# Patient Record
Sex: MALE | Race: WHITE | NOT HISPANIC OR LATINO
[De-identification: names, ages, dates, MRNs, and addresses within clinical notes are randomized per-mention and may not be internally consistent; named-entity substitution may affect disease eponyms.]

---

## 2021-08-06 PROBLEM — Z00.00 ENCOUNTER FOR PREVENTIVE HEALTH EXAMINATION: Status: ACTIVE | Noted: 2021-08-06

## 2021-08-07 ENCOUNTER — APPOINTMENT (OUTPATIENT)
Dept: OPHTHALMOLOGY | Facility: CLINIC | Age: 71
End: 2021-08-07
Payer: MEDICARE

## 2021-08-07 ENCOUNTER — NON-APPOINTMENT (OUTPATIENT)
Age: 71
End: 2021-08-07

## 2021-08-07 PROCEDURE — 92014 COMPRE OPH EXAM EST PT 1/>: CPT

## 2021-08-07 PROCEDURE — 92133 CPTRZD OPH DX IMG PST SGM ON: CPT

## 2022-02-12 ENCOUNTER — APPOINTMENT (OUTPATIENT)
Dept: OPHTHALMOLOGY | Facility: CLINIC | Age: 72
End: 2022-02-12
Payer: MEDICARE

## 2022-02-12 ENCOUNTER — NON-APPOINTMENT (OUTPATIENT)
Age: 72
End: 2022-02-12

## 2022-02-12 PROCEDURE — 92133 CPTRZD OPH DX IMG PST SGM ON: CPT

## 2022-02-12 PROCEDURE — 92014 COMPRE OPH EXAM EST PT 1/>: CPT

## 2022-04-14 ENCOUNTER — APPOINTMENT (OUTPATIENT)
Dept: OPHTHALMOLOGY | Facility: CLINIC | Age: 72
End: 2022-04-14
Payer: MEDICARE

## 2022-04-14 ENCOUNTER — NON-APPOINTMENT (OUTPATIENT)
Age: 72
End: 2022-04-14

## 2022-04-14 PROCEDURE — 92012 INTRM OPH EXAM EST PATIENT: CPT

## 2022-04-18 ENCOUNTER — APPOINTMENT (OUTPATIENT)
Dept: OPHTHALMOLOGY | Facility: CLINIC | Age: 72
End: 2022-04-18
Payer: MEDICARE

## 2022-04-18 ENCOUNTER — NON-APPOINTMENT (OUTPATIENT)
Age: 72
End: 2022-04-18

## 2022-04-18 PROCEDURE — 99213 OFFICE O/P EST LOW 20 MIN: CPT

## 2022-04-21 ENCOUNTER — NON-APPOINTMENT (OUTPATIENT)
Age: 72
End: 2022-04-21

## 2022-04-21 ENCOUNTER — APPOINTMENT (OUTPATIENT)
Dept: OPHTHALMOLOGY | Facility: CLINIC | Age: 72
End: 2022-04-21
Payer: MEDICARE

## 2022-04-21 PROCEDURE — 92012 INTRM OPH EXAM EST PATIENT: CPT

## 2022-08-20 ENCOUNTER — NON-APPOINTMENT (OUTPATIENT)
Age: 72
End: 2022-08-20

## 2022-08-20 ENCOUNTER — APPOINTMENT (OUTPATIENT)
Dept: OPHTHALMOLOGY | Facility: CLINIC | Age: 72
End: 2022-08-20

## 2022-08-20 PROCEDURE — ZZZZZ: CPT

## 2022-08-20 PROCEDURE — 92133 CPTRZD OPH DX IMG PST SGM ON: CPT

## 2022-08-20 PROCEDURE — 92015 DETERMINE REFRACTIVE STATE: CPT

## 2022-08-20 PROCEDURE — 92014 COMPRE OPH EXAM EST PT 1/>: CPT

## 2022-10-04 ENCOUNTER — APPOINTMENT (OUTPATIENT)
Dept: OPHTHALMOLOGY | Facility: CLINIC | Age: 72
End: 2022-10-04

## 2022-10-04 ENCOUNTER — NON-APPOINTMENT (OUTPATIENT)
Age: 72
End: 2022-10-04

## 2022-10-04 PROCEDURE — 99199 UNLISTED SPECIAL SVC PX/RPRT: CPT | Mod: NC

## 2023-02-17 ENCOUNTER — APPOINTMENT (OUTPATIENT)
Dept: UROLOGY | Facility: CLINIC | Age: 73
End: 2023-02-17
Payer: MEDICARE

## 2023-02-17 VITALS
SYSTOLIC BLOOD PRESSURE: 169 MMHG | BODY MASS INDEX: 28.93 KG/M2 | WEIGHT: 180 LBS | DIASTOLIC BLOOD PRESSURE: 82 MMHG | HEART RATE: 66 BPM | RESPIRATION RATE: 17 BRPM | TEMPERATURE: 97.7 F | HEIGHT: 66 IN

## 2023-02-17 PROCEDURE — 99204 OFFICE O/P NEW MOD 45 MIN: CPT

## 2023-02-23 ENCOUNTER — NON-APPOINTMENT (OUTPATIENT)
Age: 73
End: 2023-02-23

## 2023-02-23 ENCOUNTER — APPOINTMENT (OUTPATIENT)
Dept: OPHTHALMOLOGY | Facility: CLINIC | Age: 73
End: 2023-02-23
Payer: MEDICARE

## 2023-02-23 PROCEDURE — 92133 CPTRZD OPH DX IMG PST SGM ON: CPT

## 2023-02-23 PROCEDURE — 92014 COMPRE OPH EXAM EST PT 1/>: CPT

## 2023-02-28 ENCOUNTER — APPOINTMENT (OUTPATIENT)
Dept: UROLOGY | Facility: CLINIC | Age: 73
End: 2023-02-28
Payer: MEDICARE

## 2023-02-28 ENCOUNTER — LABORATORY RESULT (OUTPATIENT)
Age: 73
End: 2023-02-28

## 2023-02-28 VITALS
WEIGHT: 180 LBS | RESPIRATION RATE: 16 BRPM | BODY MASS INDEX: 28.93 KG/M2 | DIASTOLIC BLOOD PRESSURE: 72 MMHG | SYSTOLIC BLOOD PRESSURE: 143 MMHG | HEART RATE: 61 BPM | HEIGHT: 66 IN

## 2023-02-28 DIAGNOSIS — Z86.39 PERSONAL HISTORY OF OTHER ENDOCRINE, NUTRITIONAL AND METABOLIC DISEASE: ICD-10-CM

## 2023-02-28 DIAGNOSIS — Z80.0 FAMILY HISTORY OF MALIGNANT NEOPLASM OF DIGESTIVE ORGANS: ICD-10-CM

## 2023-02-28 DIAGNOSIS — Z87.891 PERSONAL HISTORY OF NICOTINE DEPENDENCE: ICD-10-CM

## 2023-02-28 DIAGNOSIS — Z86.79 PERSONAL HISTORY OF OTHER DISEASES OF THE CIRCULATORY SYSTEM: ICD-10-CM

## 2023-02-28 PROCEDURE — 99214 OFFICE O/P EST MOD 30 MIN: CPT

## 2023-02-28 NOTE — ASSESSMENT
[FreeTextEntry1] : 72M with a small R upper pole renal mass a small L AML.  We will proceed with right renal mass biopsy and ablation.\par \par Small Renal Mass\par - CBC/BMP/coags/T&S\par - Covid swab 5 days pre-procedure\par - Scheduling for CT-guided RIGHT renal mass bx and interval ablation w/ IV sedation \par \par Discussed with pt the risks/benefits/alternative to renal mass biopsy.  Risks include bleeding infrequently requiring selective embolization, pleural injury requiring placement of a chest tube, damage to nearby organs/blood vessels such as the bowel/liver/spleen, and tumor track seeding which is exceedingly uncommon.  In rare cases, there can be loss of the renal unit.  Pt was given the opportunity to ask questions, and all questions and concerns were addressed. \par \par \par Left AML\par - Surveillance, no intervention while <4cm\par \par Thank you very much for allowing me to assist in the care of this patient. Should you have any additional questions or concerns please do not hesitate to contact me.\par \par \par Sincerely,\par \par \par Rory Perez D.O.\par Professor of Urology and Radiology\par  of Urology at [facility]\par System Director for Prostate Cancer\par [address] [facility], [facility]\par Phone: [phone]\par

## 2023-02-28 NOTE — HISTORY OF PRESENT ILLNESS
[FreeTextEntry1] : [place]at[place] Tom ([facility]) Chief Complaint Renal Mass bx + Ablation\par Date of first visit:  2/28/2023\par Daughter - Tierney\par \par DOROTA STEIN  is a 72 year old  with DM HTN and a right renal mass. The patient was counseled on the treatment options with the enlarging renal mass. The patient and his daughter were here for the consult.  The patient has no hx of abdominal surgeries. Small left AML (not clinical significance).   We reviewed risk of benign vs malignant.\par \par MRI 1/5/2023 at [facility].  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  Small upper left pole AML (8x9mm).  The images have been reviewed and clinical implications discussed with the patient.   \par \par 02/28/2023\par IPSS 1-2 x nocturia \par QOL 1\par  \par The patient denies fevers, chills, nausea and or vomiting and no unexplained weight loss.\par All pertinent laboratory, films and physician notes were reviewed.  Questionnaire results were discussed with patient.

## 2023-02-28 NOTE — PHYSICAL EXAM
[General Appearance - Well Developed] : well developed [General Appearance - Well Nourished] : well nourished [Edema] : no peripheral edema [] : no respiratory distress [Abdomen Soft] : soft [Normal Station and Gait] : the gait and station were normal for the patient's age [Skin Color & Pigmentation] : normal skin color and pigmentation [No Focal Deficits] : no focal deficits [Oriented To Time, Place, And Person] : oriented to person, place, and time [Not Anxious] : not anxious

## 2023-03-01 LAB
ANION GAP SERPL CALC-SCNC: 12 MMOL/L
APTT BLD: 34.3 SEC
BUN SERPL-MCNC: 23 MG/DL
CALCIUM SERPL-MCNC: 9.9 MG/DL
CHLORIDE SERPL-SCNC: 103 MMOL/L
CO2 SERPL-SCNC: 26 MMOL/L
CREAT SERPL-MCNC: 0.77 MG/DL
EGFR: 94 ML/MIN/1.73M2
GLUCOSE SERPL-MCNC: 124 MG/DL
INR PPP: 1.24 RATIO
POTASSIUM SERPL-SCNC: 4.3 MMOL/L
PT BLD: 14.7 SEC
SODIUM SERPL-SCNC: 142 MMOL/L

## 2023-03-02 LAB
BASOPHILS # BLD AUTO: 0.03 K/UL
BASOPHILS NFR BLD AUTO: 0.7 %
EOSINOPHIL # BLD AUTO: 0.05 K/UL
EOSINOPHIL NFR BLD AUTO: 1.1 %
HCT VFR BLD CALC: 37 %
HGB BLD-MCNC: 11.6 G/DL
IMM GRANULOCYTES NFR BLD AUTO: 1.3 %
LYMPHOCYTES # BLD AUTO: 1.61 K/UL
LYMPHOCYTES NFR BLD AUTO: 36.2 %
MAN DIFF?: NORMAL
MCHC RBC-ENTMCNC: 29.7 PG
MCHC RBC-ENTMCNC: 31.4 GM/DL
MCV RBC AUTO: 94.6 FL
MONOCYTES # BLD AUTO: 0.25 K/UL
MONOCYTES NFR BLD AUTO: 5.6 %
NEUTROPHILS # BLD AUTO: 2.45 K/UL
NEUTROPHILS NFR BLD AUTO: 55.1 %
PLATELET # BLD AUTO: 194 K/UL
RBC # BLD: 3.91 M/UL
RBC # FLD: 22.5 %
WBC # FLD AUTO: 4.45 K/UL

## 2023-03-29 ENCOUNTER — OUTPATIENT (OUTPATIENT)
Dept: OUTPATIENT SERVICES | Facility: HOSPITAL | Age: 73
LOS: 1 days | End: 2023-03-29
Payer: MEDICARE

## 2023-03-29 VITALS
SYSTOLIC BLOOD PRESSURE: 146 MMHG | WEIGHT: 180.78 LBS | TEMPERATURE: 97 F | DIASTOLIC BLOOD PRESSURE: 78 MMHG | RESPIRATION RATE: 14 BRPM | HEART RATE: 53 BPM | HEIGHT: 66 IN | OXYGEN SATURATION: 98 %

## 2023-03-29 DIAGNOSIS — N28.89 OTHER SPECIFIED DISORDERS OF KIDNEY AND URETER: ICD-10-CM

## 2023-03-29 DIAGNOSIS — Z98.890 OTHER SPECIFIED POSTPROCEDURAL STATES: Chronic | ICD-10-CM

## 2023-03-29 LAB
A1C WITH ESTIMATED AVERAGE GLUCOSE RESULT: 5.4 % — SIGNIFICANT CHANGE UP (ref 4–5.6)
ALBUMIN SERPL ELPH-MCNC: 4.7 G/DL — SIGNIFICANT CHANGE UP (ref 3.3–5)
ALP SERPL-CCNC: 48 U/L — SIGNIFICANT CHANGE UP (ref 40–120)
ALT FLD-CCNC: 22 U/L — SIGNIFICANT CHANGE UP (ref 4–41)
ANION GAP SERPL CALC-SCNC: 12 MMOL/L — SIGNIFICANT CHANGE UP (ref 7–14)
APTT BLD: 34.4 SEC — SIGNIFICANT CHANGE UP (ref 27–36.3)
AST SERPL-CCNC: 22 U/L — SIGNIFICANT CHANGE UP (ref 4–40)
BILIRUB SERPL-MCNC: 0.6 MG/DL — SIGNIFICANT CHANGE UP (ref 0.2–1.2)
BUN SERPL-MCNC: 19 MG/DL — SIGNIFICANT CHANGE UP (ref 7–23)
CALCIUM SERPL-MCNC: 10 MG/DL — SIGNIFICANT CHANGE UP (ref 8.4–10.5)
CHLORIDE SERPL-SCNC: 103 MMOL/L — SIGNIFICANT CHANGE UP (ref 98–107)
CO2 SERPL-SCNC: 25 MMOL/L — SIGNIFICANT CHANGE UP (ref 22–31)
CREAT SERPL-MCNC: 0.71 MG/DL — SIGNIFICANT CHANGE UP (ref 0.5–1.3)
EGFR: 96 ML/MIN/1.73M2 — SIGNIFICANT CHANGE UP
ESTIMATED AVERAGE GLUCOSE: 108 — SIGNIFICANT CHANGE UP
GLUCOSE SERPL-MCNC: 98 MG/DL — SIGNIFICANT CHANGE UP (ref 70–99)
HCT VFR BLD CALC: 36.1 % — LOW (ref 39–50)
HGB BLD-MCNC: 11.8 G/DL — LOW (ref 13–17)
INR BLD: 1.28 RATIO — HIGH (ref 0.88–1.16)
MCHC RBC-ENTMCNC: 29.6 PG — SIGNIFICANT CHANGE UP (ref 27–34)
MCHC RBC-ENTMCNC: 32.7 GM/DL — SIGNIFICANT CHANGE UP (ref 32–36)
MCV RBC AUTO: 90.7 FL — SIGNIFICANT CHANGE UP (ref 80–100)
NRBC # BLD: 0 /100 WBCS — SIGNIFICANT CHANGE UP (ref 0–0)
NRBC # FLD: 0 K/UL — SIGNIFICANT CHANGE UP (ref 0–0)
PLATELET # BLD AUTO: 226 K/UL — SIGNIFICANT CHANGE UP (ref 150–400)
POTASSIUM SERPL-MCNC: 3.9 MMOL/L — SIGNIFICANT CHANGE UP (ref 3.5–5.3)
POTASSIUM SERPL-SCNC: 3.9 MMOL/L — SIGNIFICANT CHANGE UP (ref 3.5–5.3)
PROT SERPL-MCNC: 7.7 G/DL — SIGNIFICANT CHANGE UP (ref 6–8.3)
PROTHROM AB SERPL-ACNC: 14.9 SEC — HIGH (ref 10.5–13.4)
RBC # BLD: 3.98 M/UL — LOW (ref 4.2–5.8)
RBC # FLD: 21.2 % — HIGH (ref 10.3–14.5)
SODIUM SERPL-SCNC: 140 MMOL/L — SIGNIFICANT CHANGE UP (ref 135–145)
WBC # BLD: 4.18 K/UL — SIGNIFICANT CHANGE UP (ref 3.8–10.5)
WBC # FLD AUTO: 4.18 K/UL — SIGNIFICANT CHANGE UP (ref 3.8–10.5)

## 2023-03-29 PROCEDURE — 93010 ELECTROCARDIOGRAM REPORT: CPT

## 2023-03-29 RX ORDER — TAMSULOSIN HYDROCHLORIDE 0.4 MG/1
1 CAPSULE ORAL
Refills: 0 | DISCHARGE

## 2023-03-29 RX ORDER — AMLODIPINE BESYLATE AND BENAZEPRIL HYDROCHLORIDE 10; 20 MG/1; MG/1
1 CAPSULE ORAL
Refills: 0 | DISCHARGE

## 2023-03-29 RX ORDER — METFORMIN HYDROCHLORIDE 850 MG/1
1 TABLET ORAL
Refills: 0 | DISCHARGE

## 2023-03-29 RX ORDER — FOLIC ACID 0.8 MG
1 TABLET ORAL
Refills: 0 | DISCHARGE

## 2023-03-29 RX ORDER — AMLODIPINE AND ATORVASTATIN 5; 20 MG/1; MG/1
1 TABLET, FILM COATED ORAL
Refills: 0 | DISCHARGE

## 2023-03-29 RX ORDER — LOSARTAN/HYDROCHLOROTHIAZIDE 100MG-25MG
1 TABLET ORAL
Refills: 0 | DISCHARGE

## 2023-03-29 RX ORDER — METOPROLOL TARTRATE 50 MG
1 TABLET ORAL
Refills: 0 | DISCHARGE

## 2023-03-29 NOTE — H&P PST ADULT - PROBLEM SELECTOR PLAN 1
Pt scheduled for right renal mass bx and ablation on 4/12/2023.  labs done results pending, ekg done.  Preop teaching done, pt able to verbalize understanding.   medication day of procedure metoprolol  anesthesia- RICHARDSON precautions  pst request  pt scheduled for medical eval as per surgeon, pst will also request due to multiple comorbidities   pmd Dr. Alfredo Marvin [phone]  echo 2021  stress 2021 Pt scheduled for right renal mass bx and ablation on 4/12/2023.  labs done results pending, ekg done.  Preop teaching done, pt able to verbalize understanding.   medication day of procedure metoprolol, pepcid  anesthesia- RICHARDSON precautions  pst request  pt scheduled for medical eval as per surgeon, pst will also request due to multiple comorbidities   pmd Dr. Alfredo Marvin [phone]  echo 2021  stress 2021 Pt scheduled for right renal mass bx and ablation on 4/12/2023.  labs done results pending, ekg done.  Preop teaching done, pt able to verbalize understanding.   medication day of procedure metoprolol, pepcid  anesthesia- RICHARDSON precautions  pst request  pt scheduled for medical eval as per surgeon, pst will also request due to multiple comorbidities   pmd Dr. Alfredo Marvin [phone]  echo 2022  stress 2022

## 2023-03-29 NOTE — H&P PST ADULT - GASTROINTESTINAL
negative normal/soft/nontender/nondistended/normal active bowel sounds/no guarding/no rigidity/no organomegaly/no palpable leah/no masses palpable

## 2023-03-29 NOTE — H&P PST ADULT - HISTORY OF PRESENT ILLNESS
71y/o male scheduled for right renal mass bx and ablation on 4/12/2023.  Pt states, "has right renal mass for many years, recent evaluation shows increase in size."

## 2023-03-29 NOTE — H&P PST ADULT - NSANTHOSAYNRD_GEN_A_CORE
No. RICHARDSON screening performed.  STOP BANG Legend: 0-2 = LOW Risk; 3-4 = INTERMEDIATE Risk; 5-8 = HIGH Risk

## 2023-03-29 NOTE — H&P PST ADULT - NSICDXPASTMEDICALHX_GEN_ALL_CORE_FT
PAST MEDICAL HISTORY:  History of BPH     Hypertension     Renal mass     Type 2 diabetes mellitus

## 2023-04-10 PROBLEM — Z87.438 PERSONAL HISTORY OF OTHER DISEASES OF MALE GENITAL ORGANS: Chronic | Status: ACTIVE | Noted: 2023-03-29

## 2023-04-10 PROBLEM — E11.9 TYPE 2 DIABETES MELLITUS WITHOUT COMPLICATIONS: Chronic | Status: ACTIVE | Noted: 2023-03-29

## 2023-04-10 PROBLEM — N28.89 OTHER SPECIFIED DISORDERS OF KIDNEY AND URETER: Chronic | Status: ACTIVE | Noted: 2023-03-29

## 2023-04-10 PROBLEM — I10 ESSENTIAL (PRIMARY) HYPERTENSION: Chronic | Status: ACTIVE | Noted: 2023-03-29

## 2023-04-12 ENCOUNTER — RESULT REVIEW (OUTPATIENT)
Age: 73
End: 2023-04-12

## 2023-04-12 ENCOUNTER — APPOINTMENT (OUTPATIENT)
Dept: UROLOGY | Facility: AMBULATORY SURGERY CENTER | Age: 73
End: 2023-04-12

## 2023-04-12 ENCOUNTER — OUTPATIENT (OUTPATIENT)
Dept: OUTPATIENT SERVICES | Facility: HOSPITAL | Age: 73
LOS: 1 days | End: 2023-04-12
Payer: MEDICARE

## 2023-04-12 DIAGNOSIS — N28.89 OTHER SPECIFIED DISORDERS OF KIDNEY AND URETER: ICD-10-CM

## 2023-04-12 DIAGNOSIS — Z98.890 OTHER SPECIFIED POSTPROCEDURAL STATES: Chronic | ICD-10-CM

## 2023-04-12 LAB
GLUCOSE BLDC GLUCOMTR-MCNC: 107 MG/DL — HIGH (ref 70–99)
GLUCOSE BLDC GLUCOMTR-MCNC: 139 MG/DL — HIGH (ref 70–99)

## 2023-04-12 PROCEDURE — 88305 TISSUE EXAM BY PATHOLOGIST: CPT | Mod: 26

## 2023-04-12 PROCEDURE — 88172 CYTP DX EVAL FNA 1ST EA SITE: CPT | Mod: 26

## 2023-04-12 PROCEDURE — 77012 CT SCAN FOR NEEDLE BIOPSY: CPT | Mod: 26,59

## 2023-04-12 PROCEDURE — 50592 PERC RF ABLATE RENAL TUMOR: CPT | Mod: RT

## 2023-04-12 PROCEDURE — 50200 RENAL BIOPSY PERQ: CPT | Mod: RT

## 2023-04-12 PROCEDURE — 77013 CT GUIDE FOR TISSUE ABLATION: CPT | Mod: 26

## 2023-04-13 ENCOUNTER — NON-APPOINTMENT (OUTPATIENT)
Age: 73
End: 2023-04-13

## 2023-04-14 LAB — NON-GYNECOLOGICAL CYTOLOGY STUDY: SIGNIFICANT CHANGE UP

## 2023-04-19 DIAGNOSIS — C64.1 MALIGNANT NEOPLASM OF RIGHT KIDNEY, EXCEPT RENAL PELVIS: ICD-10-CM

## 2023-04-26 ENCOUNTER — TRANSCRIPTION ENCOUNTER (OUTPATIENT)
Age: 73
End: 2023-04-26

## 2023-05-05 ENCOUNTER — APPOINTMENT (OUTPATIENT)
Dept: UROLOGY | Facility: CLINIC | Age: 73
End: 2023-05-05
Payer: MEDICARE

## 2023-05-05 VITALS
BODY MASS INDEX: 28.61 KG/M2 | HEART RATE: 56 BPM | TEMPERATURE: 97.6 F | WEIGHT: 178 LBS | RESPIRATION RATE: 17 BRPM | HEIGHT: 66 IN | DIASTOLIC BLOOD PRESSURE: 77 MMHG | SYSTOLIC BLOOD PRESSURE: 154 MMHG

## 2023-05-05 DIAGNOSIS — N28.89 OTHER SPECIFIED DISORDERS OF KIDNEY AND URETER: ICD-10-CM

## 2023-05-05 PROCEDURE — 99213 OFFICE O/P EST LOW 20 MIN: CPT

## 2023-05-05 NOTE — PHYSICAL EXAM
[General Appearance - Well Developed] : well developed [General Appearance - Well Nourished] : well nourished [Oriented To Time, Place, And Person] : oriented to person, place, and time [Not Anxious] : not anxious [FreeTextEntry1] : accompanied by his son

## 2023-05-05 NOTE — ASSESSMENT
[FreeTextEntry1] : 73 year old gentleman with right side lesion T1A  status post biopsy and ablation. 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 He has done well since the procedure.

## 2023-06-02 ENCOUNTER — NON-APPOINTMENT (OUTPATIENT)
Age: 73
End: 2023-06-02

## 2023-06-06 ENCOUNTER — APPOINTMENT (OUTPATIENT)
Dept: OPHTHALMOLOGY | Facility: CLINIC | Age: 73
End: 2023-06-06

## 2023-06-14 ENCOUNTER — OFFICE (OUTPATIENT)
Dept: URBAN - METROPOLITAN AREA CLINIC 115 | Facility: CLINIC | Age: 73
Setting detail: OPHTHALMOLOGY
End: 2023-06-14
Payer: MEDICARE

## 2023-06-14 ENCOUNTER — RX ONLY (RX ONLY)
Age: 73
End: 2023-06-14

## 2023-06-14 DIAGNOSIS — H43.811: ICD-10-CM

## 2023-06-14 DIAGNOSIS — H40.1120: ICD-10-CM

## 2023-06-14 DIAGNOSIS — H50.10: ICD-10-CM

## 2023-06-14 DIAGNOSIS — H53.433: ICD-10-CM

## 2023-06-14 DIAGNOSIS — H50.21: ICD-10-CM

## 2023-06-14 DIAGNOSIS — E11.9: ICD-10-CM

## 2023-06-14 DIAGNOSIS — H49.03: ICD-10-CM

## 2023-06-14 DIAGNOSIS — H02.402: ICD-10-CM

## 2023-06-14 DIAGNOSIS — H25.13: ICD-10-CM

## 2023-06-14 DIAGNOSIS — H35.033: ICD-10-CM

## 2023-06-14 PROCEDURE — 92201 OPSCPY EXTND RTA DRAW UNI/BI: CPT | Performed by: OPHTHALMOLOGY

## 2023-06-14 PROCEDURE — 99203 OFFICE O/P NEW LOW 30 MIN: CPT | Performed by: OPHTHALMOLOGY

## 2023-06-14 PROCEDURE — 92083 EXTENDED VISUAL FIELD XM: CPT | Performed by: OPHTHALMOLOGY

## 2023-06-14 PROCEDURE — 92060 SENSORIMOTOR EXAMINATION: CPT | Performed by: OPHTHALMOLOGY

## 2023-06-14 PROCEDURE — 92133 CPTRZD OPH DX IMG PST SGM ON: CPT | Performed by: OPHTHALMOLOGY

## 2023-06-14 ASSESSMENT — REFRACTION_AUTOREFRACTION
OS_CYLINDER: -0.25
OD_SPHERE: +0.25
OS_AXIS: 096
OD_AXIS: 110
OD_CYLINDER: -1.00
OS_SPHERE: -1.00

## 2023-06-14 ASSESSMENT — LID POSITION - PTOSIS: OS_PTOSIS: LUL 2+

## 2023-06-14 ASSESSMENT — CONFRONTATIONAL VISUAL FIELD TEST (CVF)
OS_FINDINGS: FULL
OD_FINDINGS: FULL

## 2023-06-14 ASSESSMENT — SPHEQUIV_DERIVED
OD_SPHEQUIV: -0.25
OS_SPHEQUIV: -1.125

## 2023-06-14 ASSESSMENT — TONOMETRY
OD_IOP_MMHG: 14
OS_IOP_MMHG: 15

## 2023-06-14 ASSESSMENT — VISUAL ACUITY
OS_BCVA: 20/30-
OD_BCVA: 20/40

## 2023-06-17 ENCOUNTER — APPOINTMENT (OUTPATIENT)
Dept: OPHTHALMOLOGY | Facility: CLINIC | Age: 73
End: 2023-06-17

## 2023-06-19 ENCOUNTER — NON-APPOINTMENT (OUTPATIENT)
Age: 73
End: 2023-06-19

## 2023-06-20 ENCOUNTER — APPOINTMENT (OUTPATIENT)
Dept: OPHTHALMOLOGY | Facility: CLINIC | Age: 73
End: 2023-06-20

## 2023-07-19 ENCOUNTER — OFFICE (OUTPATIENT)
Dept: URBAN - METROPOLITAN AREA CLINIC 115 | Facility: CLINIC | Age: 73
Setting detail: OPHTHALMOLOGY
End: 2023-07-19
Payer: MEDICARE

## 2023-07-19 DIAGNOSIS — H25.13: ICD-10-CM

## 2023-07-19 DIAGNOSIS — H50.10: ICD-10-CM

## 2023-07-19 DIAGNOSIS — H40.1120: ICD-10-CM

## 2023-07-19 DIAGNOSIS — H16.222: ICD-10-CM

## 2023-07-19 DIAGNOSIS — H43.811: ICD-10-CM

## 2023-07-19 DIAGNOSIS — H35.033: ICD-10-CM

## 2023-07-19 DIAGNOSIS — H50.21: ICD-10-CM

## 2023-07-19 DIAGNOSIS — H16.221: ICD-10-CM

## 2023-07-19 DIAGNOSIS — H02.403: ICD-10-CM

## 2023-07-19 DIAGNOSIS — H49.03: ICD-10-CM

## 2023-07-19 DIAGNOSIS — E11.9: ICD-10-CM

## 2023-07-19 DIAGNOSIS — H53.433: ICD-10-CM

## 2023-07-19 PROBLEM — H16.223 DRY EYE SYNDROME K SICCA; BOTH EYES: Status: ACTIVE | Noted: 2023-07-19

## 2023-07-19 PROBLEM — E11.3293 DIABETES TYPE 2 NO RETINOPATHY; BOTH MILD WITHOUT ME: Status: ACTIVE | Noted: 2023-07-19

## 2023-07-19 PROCEDURE — 83861 MICROFLUID ANALY TEARS: CPT | Performed by: OPHTHALMOLOGY

## 2023-07-19 PROCEDURE — 92060 SENSORIMOTOR EXAMINATION: CPT | Performed by: OPHTHALMOLOGY

## 2023-07-19 PROCEDURE — 92250 FUNDUS PHOTOGRAPHY W/I&R: CPT | Performed by: OPHTHALMOLOGY

## 2023-07-19 PROCEDURE — 99213 OFFICE O/P EST LOW 20 MIN: CPT | Performed by: OPHTHALMOLOGY

## 2023-07-19 ASSESSMENT — VISUAL ACUITY
OD_BCVA: 20/40-
OS_BCVA: 20/25-

## 2023-07-19 ASSESSMENT — SPHEQUIV_DERIVED
OD_SPHEQUIV: 0.375
OS_SPHEQUIV: -0.875

## 2023-07-19 ASSESSMENT — LID POSITION - COMMENTS
OD_COMMENTS: 2MM
OS_COMMENTS: 2.5MM

## 2023-07-19 ASSESSMENT — REFRACTION_AUTOREFRACTION
OS_AXIS: 013
OD_CYLINDER: -0.25
OS_CYLINDER: -0.25
OD_AXIS: 117
OS_SPHERE: -0.75
OD_SPHERE: +0.50

## 2023-07-19 ASSESSMENT — LID POSITION - PTOSIS
OS_PTOSIS: LUL 2+
OD_PTOSIS: RUL 2+

## 2023-07-19 ASSESSMENT — TONOMETRY
OD_IOP_MMHG: 11
OS_IOP_MMHG: 12

## 2023-07-19 ASSESSMENT — CONFRONTATIONAL VISUAL FIELD TEST (CVF)
OS_FINDINGS: FULL
OD_FINDINGS: FULL

## 2023-08-31 ENCOUNTER — NON-APPOINTMENT (OUTPATIENT)
Age: 73
End: 2023-08-31

## 2023-11-08 ENCOUNTER — OFFICE (OUTPATIENT)
Dept: URBAN - METROPOLITAN AREA CLINIC 115 | Facility: CLINIC | Age: 73
Setting detail: OPHTHALMOLOGY
End: 2023-11-08
Payer: MEDICARE

## 2023-11-08 DIAGNOSIS — H50.10: ICD-10-CM

## 2023-11-08 DIAGNOSIS — H53.433: ICD-10-CM

## 2023-11-08 DIAGNOSIS — E11.3293: ICD-10-CM

## 2023-11-08 DIAGNOSIS — H49.03: ICD-10-CM

## 2023-11-08 DIAGNOSIS — H25.13: ICD-10-CM

## 2023-11-08 DIAGNOSIS — H02.014: ICD-10-CM

## 2023-11-08 PROCEDURE — 67820 REVISE EYELASHES: CPT | Mod: LT | Performed by: OPHTHALMOLOGY

## 2023-11-08 PROCEDURE — 99213 OFFICE O/P EST LOW 20 MIN: CPT | Mod: 25 | Performed by: OPHTHALMOLOGY

## 2023-11-08 PROCEDURE — 92133 CPTRZD OPH DX IMG PST SGM ON: CPT | Performed by: OPHTHALMOLOGY

## 2023-11-08 PROCEDURE — 92060 SENSORIMOTOR EXAMINATION: CPT | Performed by: OPHTHALMOLOGY

## 2023-11-08 ASSESSMENT — REFRACTION_MANIFEST
OD_ADD: +2.50
OS_CYLINDER: -0.75
OD_CYLINDER: -1.25
OS_SPHERE: -1.00
OD_AXIS: 100
OD_SPHERE: +0.75
OS_ADD: +2.50
OS_AXIS: 085
OD_VA1: 20/30
OS_VA1: 20/40

## 2023-11-08 ASSESSMENT — LID POSITION - COMMENTS
OS_COMMENTS: 2.5MM
OD_COMMENTS: 2MM

## 2023-11-08 ASSESSMENT — REFRACTION_AUTOREFRACTION
OD_CYLINDER: -1.25
OS_SPHERE: -0.25
OS_AXIS: 075
OD_AXIS: 110
OS_CYLINDER: -0.75
OD_SPHERE: +0.50

## 2023-11-08 ASSESSMENT — LID POSITION - PTOSIS
OS_PTOSIS: LUL 2+
OD_PTOSIS: RUL 2+

## 2023-11-08 ASSESSMENT — LID EXAM ASSESSMENTS: OS_TRICHIASIS: LUL

## 2023-11-08 ASSESSMENT — CONFRONTATIONAL VISUAL FIELD TEST (CVF)
OD_FINDINGS: FULL
OS_FINDINGS: FULL

## 2023-11-08 ASSESSMENT — SPHEQUIV_DERIVED
OS_SPHEQUIV: -1.375
OD_SPHEQUIV: 0.125
OD_SPHEQUIV: -0.125
OS_SPHEQUIV: -0.625

## 2023-12-18 ENCOUNTER — OFFICE (OUTPATIENT)
Dept: URBAN - METROPOLITAN AREA CLINIC 115 | Facility: CLINIC | Age: 73
Setting detail: OPHTHALMOLOGY
End: 2023-12-18
Payer: MEDICARE

## 2023-12-18 DIAGNOSIS — H25.12: ICD-10-CM

## 2023-12-18 DIAGNOSIS — H25.13: ICD-10-CM

## 2023-12-18 PROBLEM — H02.831 DERMATOCHALASIS; RIGHT UPPER LID: Status: ACTIVE | Noted: 2023-12-18

## 2023-12-18 PROBLEM — H02.402 PTOSIS OF EYELID, UNSPECIFIED; LEFT EYE: Status: ACTIVE | Noted: 2023-12-18

## 2023-12-18 PROCEDURE — 99214 OFFICE O/P EST MOD 30 MIN: CPT | Performed by: OPHTHALMOLOGY

## 2023-12-18 PROCEDURE — 92136 OPHTHALMIC BIOMETRY: CPT | Mod: TC | Performed by: OPHTHALMOLOGY

## 2023-12-18 PROCEDURE — 92136 OPHTHALMIC BIOMETRY: CPT | Mod: 26,LT | Performed by: OPHTHALMOLOGY

## 2023-12-18 ASSESSMENT — REFRACTION_MANIFEST
OS_CYLINDER: -0.75
OD_ADD: +2.50
OD_VA1: 20/30
OS_ADD: +2.50
OS_VA1: 20/40
OD_CYLINDER: -1.25
OD_AXIS: 100
OS_AXIS: 085
OD_SPHERE: +0.75
OS_SPHERE: -1.00

## 2023-12-18 ASSESSMENT — REFRACTION_AUTOREFRACTION
OS_AXIS: 064
OD_CYLINDER: -1.25
OS_SPHERE: -0.25
OS_CYLINDER: -0.50
OD_SPHERE: +0.50
OD_AXIS: 111

## 2023-12-18 ASSESSMENT — LID POSITION - COMMENTS
OS_COMMENTS: 2.5MM
OD_COMMENTS: 2MM

## 2023-12-18 ASSESSMENT — SPHEQUIV_DERIVED
OS_SPHEQUIV: -1.375
OS_SPHEQUIV: -0.5
OD_SPHEQUIV: -0.125
OD_SPHEQUIV: 0.125

## 2023-12-18 ASSESSMENT — LID POSITION - DERMATOCHALASIS: OD_DERMATOCHALASIS: RUL 1+

## 2023-12-18 ASSESSMENT — LID POSITION - PTOSIS
OS_PTOSIS: LUL 2+
OD_PTOSIS: RUL 2+

## 2023-12-18 ASSESSMENT — CONFRONTATIONAL VISUAL FIELD TEST (CVF)
OS_FINDINGS: FULL
OD_FINDINGS: FULL

## 2023-12-18 ASSESSMENT — LID EXAM ASSESSMENTS: OS_TRICHIASIS: LUL

## 2024-01-15 ENCOUNTER — OFFICE (OUTPATIENT)
Facility: LOCATION | Age: 74
Setting detail: OPHTHALMOLOGY
End: 2024-01-15
Payer: MEDICARE

## 2024-01-15 DIAGNOSIS — H25.13: ICD-10-CM

## 2024-01-15 DIAGNOSIS — H01.001: ICD-10-CM

## 2024-01-15 DIAGNOSIS — H40.1120: ICD-10-CM

## 2024-01-15 DIAGNOSIS — H11.153: ICD-10-CM

## 2024-01-15 DIAGNOSIS — H50.10: ICD-10-CM

## 2024-01-15 DIAGNOSIS — H02.402: ICD-10-CM

## 2024-01-15 DIAGNOSIS — H01.004: ICD-10-CM

## 2024-01-15 DIAGNOSIS — E11.9: ICD-10-CM

## 2024-01-15 PROCEDURE — 92060 SENSORIMOTOR EXAMINATION: CPT | Performed by: OPHTHALMOLOGY

## 2024-01-15 PROCEDURE — 99213 OFFICE O/P EST LOW 20 MIN: CPT | Performed by: OPHTHALMOLOGY

## 2024-01-15 PROCEDURE — 92133 CPTRZD OPH DX IMG PST SGM ON: CPT | Performed by: OPHTHALMOLOGY

## 2024-01-15 ASSESSMENT — LID EXAM ASSESSMENTS
OD_COMMENTS: ROSACEA
OD_BLEPHARITIS: RUL T
OS_COMMENTS: ROSACEA
OS_BLEPHARITIS: LUL T

## 2024-01-15 ASSESSMENT — CONFRONTATIONAL VISUAL FIELD TEST (CVF)
OS_FINDINGS: FULL
OD_FINDINGS: FULL

## 2024-01-18 PROBLEM — H02.402 PTOSIS OF EYELID, UNSPECIFIED; LEFT EYE: Status: ACTIVE | Noted: 2024-01-15

## 2024-01-18 ASSESSMENT — LID POSITION - PTOSIS
OS_PTOSIS: LUL 1+
OD_PTOSIS: RUL 1+

## 2024-01-18 ASSESSMENT — LID POSITION - COMMENTS
OS_COMMENTS: 2.5MM
OD_COMMENTS: 2MM

## 2024-01-19 ASSESSMENT — REFRACTION_AUTOREFRACTION
OD_SPHERE: +0.50
OS_AXIS: 064
OD_AXIS: 111
OD_CYLINDER: -1.25
OS_CYLINDER: -0.50
OS_SPHERE: -0.25

## 2024-01-19 ASSESSMENT — REFRACTION_MANIFEST
OD_ADD: +2.50
OD_VA1: 20/30
OD_SPHERE: +0.75
OD_AXIS: 100
OS_VA1: 20/40
OS_SPHERE: -1.00
OS_AXIS: 085
OS_CYLINDER: -0.75
OD_CYLINDER: -1.25
OS_ADD: +2.50

## 2024-01-19 ASSESSMENT — SPHEQUIV_DERIVED
OD_SPHEQUIV: -0.125
OS_SPHEQUIV: -1.375
OD_SPHEQUIV: 0.125
OS_SPHEQUIV: -0.5

## 2024-02-07 ENCOUNTER — OFFICE (OUTPATIENT)
Facility: LOCATION | Age: 74
Setting detail: OPHTHALMOLOGY
End: 2024-02-07
Payer: MEDICARE

## 2024-02-07 ENCOUNTER — RX ONLY (RX ONLY)
Age: 74
End: 2024-02-07

## 2024-02-07 DIAGNOSIS — H52.13: ICD-10-CM

## 2024-02-07 DIAGNOSIS — H25.13: ICD-10-CM

## 2024-02-07 DIAGNOSIS — H40.1120: ICD-10-CM

## 2024-02-07 DIAGNOSIS — Z01.818: ICD-10-CM

## 2024-02-07 PROCEDURE — 99214 OFFICE O/P EST MOD 30 MIN: CPT | Performed by: OPHTHALMOLOGY

## 2024-02-07 PROCEDURE — 92136 OPHTHALMIC BIOMETRY: CPT | Performed by: OPHTHALMOLOGY

## 2024-02-07 PROCEDURE — 92286 ANT SGM IMG I&R SPECLR MIC: CPT | Mod: GA | Performed by: OPHTHALMOLOGY

## 2024-02-07 PROCEDURE — 92015 DETERMINE REFRACTIVE STATE: CPT | Performed by: OPHTHALMOLOGY

## 2024-02-07 PROCEDURE — 92134 CPTRZ OPH DX IMG PST SGM RTA: CPT | Mod: GA | Performed by: OPHTHALMOLOGY

## 2024-02-07 PROCEDURE — 92025 CPTRIZED CORNEAL TOPOGRAPHY: CPT | Mod: GA | Performed by: OPHTHALMOLOGY

## 2024-02-07 PROCEDURE — MAC PRE OP RETINA SCREENING: Performed by: OPHTHALMOLOGY

## 2024-02-07 RX ORDER — ACETAZOLAMIDE 500 MG/1
CAPSULE ORAL
Qty: 12 | Refills: 0 | Status: ACTIVE | OUTPATIENT

## 2024-02-07 RX ORDER — PREDNISOLONE/MOXIFLOX/BROMFEN 1 %-0.5 %
SUSPENSION, DROPS(FINAL DOSAGE FORM)(ML) OPHTHALMIC (EYE)
Qty: 10 | Refills: 1 | Status: ACTIVE | OUTPATIENT

## 2024-02-07 ASSESSMENT — LID EXAM ASSESSMENTS
OD_COMMENTS: ROSACEA
OS_BLEPHARITIS: LUL T
OS_COMMENTS: ROSACEA
OD_BLEPHARITIS: RUL T

## 2024-02-07 ASSESSMENT — LID POSITION - COMMENTS
OS_COMMENTS: 2.5MM
OD_COMMENTS: 2MM

## 2024-02-07 ASSESSMENT — LID POSITION - PTOSIS
OD_PTOSIS: RUL 1+
OS_PTOSIS: LUL 1+

## 2024-02-09 ASSESSMENT — SPHEQUIV_DERIVED
OS_SPHEQUIV: -1.25
OD_SPHEQUIV: -0.5
OD_SPHEQUIV: -0.25
OD_SPHEQUIV: 0.125
OS_SPHEQUIV: -1.375

## 2024-02-09 ASSESSMENT — REFRACTION_MANIFEST
OS_AXIS: 085
OD_ADD: +2.50
OD_AXIS: 100
OS_ADD: +2.50
OS_CYLINDER: -0.75
OD_AXIS: 5
OS_AXIS: 5
OD_SPHERE: -0.50
OD_CYLINDER: -1.25
OD_VA1: 20/30
OD_VA1: 20/25-2
OS_VA1: 20/40
OD_SPHERE: +0.75
OS_VA1: 20/25-2
OS_CYLINDER: +0.50
OD_CYLINDER: +0.50
OS_SPHERE: -1.00
OS_SPHERE: -1.50

## 2024-02-09 ASSESSMENT — REFRACTION_AUTOREFRACTION
OS_CYLINDER: SPHERE
OD_CYLINDER: +1.00
OS_SPHERE: -1.25
OD_AXIS: 19
OD_SPHERE: -1.00

## 2024-02-22 ENCOUNTER — ASC (OUTPATIENT)
Dept: URBAN - METROPOLITAN AREA SURGERY 12 | Facility: SURGERY | Age: 74
Setting detail: OPHTHALMOLOGY
End: 2024-02-22
Payer: MEDICARE

## 2024-02-22 DIAGNOSIS — H52.212: ICD-10-CM

## 2024-02-22 DIAGNOSIS — H25.12: ICD-10-CM

## 2024-02-22 PROCEDURE — A9270 NON-COVERED ITEM OR SERVICE: HCPCS | Mod: GY | Performed by: OPHTHALMOLOGY

## 2024-02-22 PROCEDURE — 66984 XCAPSL CTRC RMVL W/O ECP: CPT | Mod: LT | Performed by: OPHTHALMOLOGY

## 2024-02-22 PROCEDURE — FEMTO PRECISION LASER CATARACT SURGERY: Mod: GY | Performed by: OPHTHALMOLOGY

## 2024-02-23 ENCOUNTER — OFFICE (OUTPATIENT)
Facility: LOCATION | Age: 74
Setting detail: OPHTHALMOLOGY
End: 2024-02-23
Payer: MEDICARE

## 2024-02-23 DIAGNOSIS — Z96.1: ICD-10-CM

## 2024-02-23 PROCEDURE — 99024 POSTOP FOLLOW-UP VISIT: CPT | Performed by: OPTOMETRIST

## 2024-02-23 ASSESSMENT — REFRACTION_MANIFEST
OS_SPHERE: -1.00
OD_AXIS: 5
OD_SPHERE: +0.75
OS_CYLINDER: -0.75
OS_AXIS: 085
OS_AXIS: 5
OD_CYLINDER: -1.25
OS_CYLINDER: +0.50
OS_VA1: 20/40
OD_AXIS: 100
OS_SPHERE: -1.50
OD_VA1: 20/30
OD_VA1: 20/25-2
OS_ADD: +2.50
OD_ADD: +2.50
OD_SPHERE: -0.50
OS_VA1: 20/25-2
OD_CYLINDER: +0.50

## 2024-02-23 ASSESSMENT — CORNEAL EDEMA CLINICAL DESCRIPTION: OS_CORNEALEDEMA: 1+

## 2024-02-23 ASSESSMENT — SPHEQUIV_DERIVED
OD_SPHEQUIV: -0.5
OD_SPHEQUIV: -0.25
OD_SPHEQUIV: 0.125
OS_SPHEQUIV: -1.375
OS_SPHEQUIV: -1.25

## 2024-02-23 ASSESSMENT — REFRACTION_AUTOREFRACTION
OD_SPHERE: -1.00
OS_SPHERE: -1.25
OS_CYLINDER: SPHERE
OD_AXIS: 19
OD_CYLINDER: +1.00

## 2024-02-28 ENCOUNTER — OFFICE (OUTPATIENT)
Facility: LOCATION | Age: 74
Setting detail: OPHTHALMOLOGY
End: 2024-02-28
Payer: MEDICARE

## 2024-02-28 DIAGNOSIS — H25.11: ICD-10-CM

## 2024-02-28 DIAGNOSIS — Z01.818: ICD-10-CM

## 2024-02-28 DIAGNOSIS — H52.12: ICD-10-CM

## 2024-02-28 DIAGNOSIS — H52.01: ICD-10-CM

## 2024-02-28 PROBLEM — Z96.1 PSEUDOPHAKIA ; LEFT EYE: Status: ACTIVE | Noted: 2024-02-23

## 2024-02-28 PROBLEM — H25.12 CATARACT SENILE NUCLEAR SCLEROSIS; LEFT EYE: Status: ACTIVE | Noted: 2024-02-23

## 2024-02-28 PROBLEM — H11.152 PINGUECULA;  , LEFT EYE: Status: ACTIVE | Noted: 2024-02-23

## 2024-02-28 PROCEDURE — MAC PRE OP RETINA SCREENING: Performed by: OPHTHALMOLOGY

## 2024-02-28 PROCEDURE — 92025 CPTRIZED CORNEAL TOPOGRAPHY: CPT | Mod: GA | Performed by: OPHTHALMOLOGY

## 2024-02-28 PROCEDURE — 99214 OFFICE O/P EST MOD 30 MIN: CPT | Mod: 24 | Performed by: OPHTHALMOLOGY

## 2024-02-28 PROCEDURE — 92134 CPTRZ OPH DX IMG PST SGM RTA: CPT | Mod: GA | Performed by: OPHTHALMOLOGY

## 2024-02-28 ASSESSMENT — LID EXAM ASSESSMENTS
OS_COMMENTS: ROSACEA
OD_COMMENTS: ROSACEA
OD_BLEPHARITIS: RUL T
OS_BLEPHARITIS: LUL T

## 2024-02-28 ASSESSMENT — LID POSITION - PTOSIS
OS_PTOSIS: LUL 1+
OD_PTOSIS: RUL 1+

## 2024-02-28 ASSESSMENT — CORNEAL EDEMA CLINICAL DESCRIPTION: OS_CORNEALEDEMA: 1+

## 2024-02-28 ASSESSMENT — LID POSITION - COMMENTS
OD_COMMENTS: 2MM
OS_COMMENTS: 2.5MM

## 2024-03-01 ASSESSMENT — REFRACTION_MANIFEST
OS_CYLINDER: +0.50
OS_SPHERE: -1.50
OS_ADD: +2.50
OS_VA1: 20/25-3
OD_AXIS: 5
OS_SPHERE: -0.25
OD_CYLINDER: +0.50
OS_AXIS: 5
OD_VA1: 20/25-2
OS_CYLINDER: SPHERE
OD_ADD: +2.50
OD_SPHERE: -0.50
OS_VA1: 20/25-2

## 2024-03-01 ASSESSMENT — SPHEQUIV_DERIVED
OD_SPHEQUIV: -0.25
OS_SPHEQUIV: -1.25

## 2024-03-01 ASSESSMENT — REFRACTION_AUTOREFRACTION
OS_SPHERE: UNABLE
OD_SPHERE: UNABLE

## 2024-03-07 ENCOUNTER — ASC (OUTPATIENT)
Dept: URBAN - METROPOLITAN AREA SURGERY 12 | Facility: SURGERY | Age: 74
Setting detail: OPHTHALMOLOGY
End: 2024-03-07
Payer: MEDICARE

## 2024-03-07 DIAGNOSIS — H25.11: ICD-10-CM

## 2024-03-07 DIAGNOSIS — H52.211: ICD-10-CM

## 2024-03-07 PROCEDURE — A9270 NON-COVERED ITEM OR SERVICE: HCPCS | Mod: GY | Performed by: OPHTHALMOLOGY

## 2024-03-07 PROCEDURE — FEMTO PRECISION LASER CATARACT SURGERY: Mod: GY | Performed by: OPHTHALMOLOGY

## 2024-03-07 PROCEDURE — 66984 XCAPSL CTRC RMVL W/O ECP: CPT | Mod: 79,RT | Performed by: OPHTHALMOLOGY

## 2024-03-08 ENCOUNTER — OFFICE (OUTPATIENT)
Facility: LOCATION | Age: 74
Setting detail: OPHTHALMOLOGY
End: 2024-03-08
Payer: MEDICARE

## 2024-03-08 DIAGNOSIS — Z96.1: ICD-10-CM

## 2024-03-08 PROCEDURE — 99024 POSTOP FOLLOW-UP VISIT: CPT | Performed by: OPTOMETRIST

## 2024-03-08 ASSESSMENT — SPHEQUIV_DERIVED
OD_SPHEQUIV: -0.25
OS_SPHEQUIV: -1.25

## 2024-03-08 ASSESSMENT — REFRACTION_MANIFEST
OS_ADD: +2.50
OD_ADD: +2.50
OS_AXIS: 5
OS_VA1: 20/25-3
OD_CYLINDER: +0.50
OD_SPHERE: -0.50
OS_SPHERE: -1.50
OS_CYLINDER: SPHERE
OS_SPHERE: -0.25
OS_VA1: 20/25-2
OD_VA1: 20/25-2
OS_CYLINDER: +0.50
OD_AXIS: 5

## 2024-03-08 ASSESSMENT — LID POSITION - COMMENTS
OD_COMMENTS: 2MM
OS_COMMENTS: 2.5MM

## 2024-03-08 ASSESSMENT — LID EXAM ASSESSMENTS
OS_BLEPHARITIS: LUL T
OD_COMMENTS: ROSACEA
OD_BLEPHARITIS: RUL T
OS_COMMENTS: ROSACEA

## 2024-03-08 ASSESSMENT — LID POSITION - PTOSIS
OD_PTOSIS: RUL 1+
OS_PTOSIS: LUL 1+

## 2024-03-13 ENCOUNTER — RX ONLY (RX ONLY)
Age: 74
End: 2024-03-13

## 2024-03-13 ENCOUNTER — OFFICE (OUTPATIENT)
Facility: LOCATION | Age: 74
Setting detail: OPHTHALMOLOGY
End: 2024-03-13
Payer: MEDICARE

## 2024-03-13 DIAGNOSIS — Z96.1: ICD-10-CM

## 2024-03-13 DIAGNOSIS — H11.31: ICD-10-CM

## 2024-03-13 PROCEDURE — 99024 POSTOP FOLLOW-UP VISIT: CPT | Performed by: OPTOMETRIST

## 2024-03-18 ASSESSMENT — REFRACTION_MANIFEST
OS_VA1: 20/25-2
OD_SPHERE: -0.50
OD_VA1: 20/25-2
OD_CYLINDER: +0.50
OS_SPHERE: -1.50
OD_ADD: +2.50
OS_VA1: 20/25-3
OS_CYLINDER: +0.50
OS_SPHERE: -0.25
OS_CYLINDER: SPHERE
OS_ADD: +2.50
OD_AXIS: 5
OS_AXIS: 5

## 2024-03-18 ASSESSMENT — SPHEQUIV_DERIVED
OS_SPHEQUIV: -1.25
OD_SPHEQUIV: -0.25

## 2024-03-27 ENCOUNTER — OFFICE (OUTPATIENT)
Facility: LOCATION | Age: 74
Setting detail: OPHTHALMOLOGY
End: 2024-03-27
Payer: MEDICARE

## 2024-03-27 DIAGNOSIS — Z96.1: ICD-10-CM

## 2024-03-27 PROBLEM — H11.31 SUB-CONJUNCTIVAL HEMORRHAGE; RIGHT EYE: Status: ACTIVE | Noted: 2024-03-13

## 2024-03-27 PROBLEM — H11.153 PINGUECULA;  ,,, BOTH EYES: Status: ACTIVE | Noted: 2024-03-13

## 2024-03-27 PROBLEM — H25.11 CATARACT SENILE NUCLEAR SCLEROSIS; RIGHT EYE: Status: ACTIVE | Noted: 2024-03-08

## 2024-03-27 PROCEDURE — 99024 POSTOP FOLLOW-UP VISIT: CPT | Performed by: OPHTHALMOLOGY

## 2024-03-28 ASSESSMENT — REFRACTION_MANIFEST
OS_VA1: 20/25-2
OD_VA1: 20/25-2
OD_AXIS: 5
OS_SPHERE: -0.25
OD_VA1: 20/25
OS_CYLINDER: +0.50
OD_SPHERE: -0.25
OD_ADD: +2.50
OS_VA1: 20/25+2
OD_CYLINDER: SPHERE
OS_AXIS: 5
OS_CYLINDER: +0.50
OS_ADD: +2.50
OS_AXIS: 120
OD_SPHERE: -0.50
OD_CYLINDER: +0.50
OS_SPHERE: -1.50

## 2024-03-28 ASSESSMENT — SPHEQUIV_DERIVED
OS_SPHEQUIV: -1.25
OD_SPHEQUIV: -0.25
OS_SPHEQUIV: 0

## 2024-04-16 ENCOUNTER — OFFICE (OUTPATIENT)
Facility: LOCATION | Age: 74
Setting detail: OPHTHALMOLOGY
End: 2024-04-16
Payer: MEDICARE

## 2024-04-16 DIAGNOSIS — Z96.1: ICD-10-CM

## 2024-04-16 PROCEDURE — 99024 POSTOP FOLLOW-UP VISIT: CPT | Performed by: OPTOMETRIST

## 2024-04-16 ASSESSMENT — LID EXAM ASSESSMENTS
OS_BLEPHARITIS: LUL T
OD_BLEPHARITIS: RUL T
OS_COMMENTS: ROSACEA
OD_COMMENTS: ROSACEA

## 2024-04-16 ASSESSMENT — LID POSITION - PTOSIS
OS_PTOSIS: LUL 1+
OD_PTOSIS: RUL 1+

## 2024-04-16 ASSESSMENT — LID POSITION - DERMATOCHALASIS
OS_DERMATOCHALASIS: T
OD_DERMATOCHALASIS: T

## 2024-04-16 ASSESSMENT — LID POSITION - COMMENTS
OD_COMMENTS: 2MM
OS_COMMENTS: 2.5MM

## 2024-04-26 ENCOUNTER — RX ONLY (RX ONLY)
Age: 74
End: 2024-04-26

## 2024-04-26 ENCOUNTER — OFFICE (OUTPATIENT)
Facility: LOCATION | Age: 74
Setting detail: OPHTHALMOLOGY
End: 2024-04-26
Payer: MEDICARE

## 2024-04-26 DIAGNOSIS — Z96.1: ICD-10-CM

## 2024-04-26 DIAGNOSIS — H52.4: ICD-10-CM

## 2024-04-26 PROBLEM — H02.83 DERMATOCHALASIS; RIGHT UPPER LID: Status: ACTIVE | Noted: 2024-04-16

## 2024-04-26 PROCEDURE — 92015 DETERMINE REFRACTIVE STATE: CPT | Mod: GA | Performed by: OPHTHALMOLOGY

## 2024-04-26 PROCEDURE — 99024 POSTOP FOLLOW-UP VISIT: CPT | Performed by: OPHTHALMOLOGY

## 2024-04-26 ASSESSMENT — LID POSITION - PTOSIS
OD_PTOSIS: RUL 1+
OS_PTOSIS: LUL 1+

## 2024-04-26 ASSESSMENT — LID EXAM ASSESSMENTS
OD_BLEPHARITIS: RUL T
OD_COMMENTS: ROSACEA
OS_BLEPHARITIS: LUL T
OS_COMMENTS: ROSACEA

## 2024-04-26 ASSESSMENT — LID POSITION - DERMATOCHALASIS
OD_DERMATOCHALASIS: T
OS_DERMATOCHALASIS: T

## 2024-04-26 ASSESSMENT — LID POSITION - COMMENTS
OD_COMMENTS: 2MM
OS_COMMENTS: 2.5MM

## 2024-04-29 PROBLEM — H02.402 PTOSIS OF EYELID, UNSPECIFIED; LEFT EYE: Status: ACTIVE | Noted: 2024-04-26

## 2024-07-01 ENCOUNTER — RX ONLY (RX ONLY)
Age: 74
End: 2024-07-01

## 2024-07-01 ENCOUNTER — OFFICE (OUTPATIENT)
Facility: LOCATION | Age: 74
Setting detail: OPHTHALMOLOGY
End: 2024-07-01
Payer: MEDICARE

## 2024-07-01 DIAGNOSIS — E11.9: ICD-10-CM

## 2024-07-01 DIAGNOSIS — H52.03: ICD-10-CM

## 2024-07-01 DIAGNOSIS — H26.493: ICD-10-CM

## 2024-07-01 DIAGNOSIS — H16.223: ICD-10-CM

## 2024-07-01 PROCEDURE — 99214 OFFICE O/P EST MOD 30 MIN: CPT | Performed by: OPHTHALMOLOGY

## 2024-07-01 PROCEDURE — 92015 DETERMINE REFRACTIVE STATE: CPT | Mod: GA | Performed by: OPHTHALMOLOGY

## 2024-07-01 ASSESSMENT — LID POSITION - COMMENTS
OS_COMMENTS: 2.5MM
OD_COMMENTS: 2MM

## 2024-07-01 ASSESSMENT — LID POSITION - PTOSIS
OD_PTOSIS: RUL 1+
OS_PTOSIS: LUL 1+

## 2024-07-01 ASSESSMENT — CONFRONTATIONAL VISUAL FIELD TEST (CVF)
OS_FINDINGS: FULL
OD_FINDINGS: FULL

## 2024-07-01 ASSESSMENT — LID EXAM ASSESSMENTS
OD_COMMENTS: ROSACEA
OS_COMMENTS: ROSACEA
OD_BLEPHARITIS: RUL T
OS_BLEPHARITIS: LUL T

## 2024-07-01 ASSESSMENT — LID POSITION - DERMATOCHALASIS
OS_DERMATOCHALASIS: T
OD_DERMATOCHALASIS: T

## 2024-07-15 ENCOUNTER — OFFICE (OUTPATIENT)
Facility: LOCATION | Age: 74
Setting detail: OPHTHALMOLOGY
End: 2024-07-15
Payer: MEDICARE

## 2024-07-15 DIAGNOSIS — H26.491: ICD-10-CM

## 2024-07-15 PROCEDURE — 66821 AFTER CATARACT LASER SURGERY: CPT | Mod: RT | Performed by: OPHTHALMOLOGY

## 2024-07-15 ASSESSMENT — LID POSITION - DERMATOCHALASIS
OS_DERMATOCHALASIS: T
OD_DERMATOCHALASIS: T

## 2024-07-15 ASSESSMENT — LID POSITION - COMMENTS
OD_COMMENTS: 2MM
OS_COMMENTS: 2.5MM

## 2024-07-15 ASSESSMENT — LID EXAM ASSESSMENTS
OS_COMMENTS: ROSACEA
OD_BLEPHARITIS: RUL T
OD_COMMENTS: ROSACEA
OS_BLEPHARITIS: LUL T

## 2024-07-15 ASSESSMENT — LID POSITION - PTOSIS
OS_PTOSIS: LUL 1+
OD_PTOSIS: RUL 1+

## 2024-07-18 ENCOUNTER — RX ONLY (RX ONLY)
Age: 74
End: 2024-07-18

## 2024-07-29 ENCOUNTER — OFFICE (OUTPATIENT)
Facility: LOCATION | Age: 74
Setting detail: OPHTHALMOLOGY
End: 2024-07-29
Payer: MEDICARE

## 2024-07-29 DIAGNOSIS — H26.492: ICD-10-CM

## 2024-07-29 PROBLEM — H43.811 VITREOUS DETACHMENT; RIGHT EYE: Status: ACTIVE | Noted: 2024-07-01

## 2024-07-29 PROCEDURE — 66821 AFTER CATARACT LASER SURGERY: CPT | Mod: 79,LT | Performed by: OPHTHALMOLOGY

## 2024-07-29 ASSESSMENT — LID EXAM ASSESSMENTS
OS_COMMENTS: ROSACEA
OD_BLEPHARITIS: RUL T
OS_BLEPHARITIS: LUL T
OD_COMMENTS: ROSACEA

## 2024-07-29 ASSESSMENT — LID POSITION - DERMATOCHALASIS
OD_DERMATOCHALASIS: T
OS_DERMATOCHALASIS: T

## 2024-07-29 ASSESSMENT — LID POSITION - PTOSIS
OS_PTOSIS: LUL 1+
OD_PTOSIS: RUL 1+

## 2024-07-29 ASSESSMENT — LID POSITION - COMMENTS
OD_COMMENTS: 2MM
OS_COMMENTS: 2.5MM

## 2024-07-31 PROBLEM — H02.402 PTOSIS OF EYELID, UNSPECIFIED; LEFT EYE: Status: ACTIVE | Noted: 2024-07-29

## 2024-08-22 ENCOUNTER — OFFICE (OUTPATIENT)
Facility: LOCATION | Age: 74
Setting detail: OPHTHALMOLOGY
End: 2024-08-22
Payer: MEDICARE

## 2024-08-22 DIAGNOSIS — H26.493: ICD-10-CM

## 2024-08-22 PROBLEM — H02.402 PTOSIS OF EYELID, UNSPECIFIED; LEFT EYE: Status: ACTIVE | Noted: 2024-08-22

## 2024-08-22 PROCEDURE — 99024 POSTOP FOLLOW-UP VISIT: CPT | Performed by: OPTOMETRIST

## 2024-08-22 ASSESSMENT — LID POSITION - COMMENTS
OS_COMMENTS: 2.5MM
OD_COMMENTS: 2MM

## 2024-08-22 ASSESSMENT — LID EXAM ASSESSMENTS
OD_COMMENTS: ROSACEA
OS_BLEPHARITIS: LUL T
OS_COMMENTS: ROSACEA
OD_BLEPHARITIS: RUL T

## 2024-08-22 ASSESSMENT — LID POSITION - PTOSIS
OD_PTOSIS: RUL 1+
OS_PTOSIS: LUL 1+

## 2024-08-22 ASSESSMENT — LID POSITION - DERMATOCHALASIS
OD_DERMATOCHALASIS: T
OS_DERMATOCHALASIS: T

## 2024-11-18 ENCOUNTER — OFFICE (OUTPATIENT)
Facility: LOCATION | Age: 74
Setting detail: OPHTHALMOLOGY
End: 2024-11-18
Payer: MEDICARE

## 2024-11-18 DIAGNOSIS — H16.223: ICD-10-CM

## 2024-11-18 DIAGNOSIS — H26.493: ICD-10-CM

## 2024-11-18 DIAGNOSIS — H10.433: ICD-10-CM

## 2024-11-18 PROCEDURE — 99213 OFFICE O/P EST LOW 20 MIN: CPT | Performed by: OPTOMETRIST

## 2024-11-18 ASSESSMENT — CONFRONTATIONAL VISUAL FIELD TEST (CVF)
OS_FINDINGS: FULL
OD_FINDINGS: FULL

## 2024-11-18 ASSESSMENT — TONOMETRY
OS_IOP_MMHG: 14
OD_IOP_MMHG: 14

## 2024-11-18 ASSESSMENT — SUPERFICIAL PUNCTATE KERATITIS (SPK)
OS_SPK: 1+
OD_SPK: 1+

## 2024-11-20 PROBLEM — H02.402 PTOSIS OF EYELID, UNSPECIFIED; LEFT EYE: Status: ACTIVE | Noted: 2024-11-18

## 2024-11-20 ASSESSMENT — REFRACTION_MANIFEST
OD_AXIS: 5
OS_VA1: 20/25+2
OD_VA1: 20/20-2
OD_SPHERE: -0.25
OS_SPHERE: -1.50
OS_VA1: 20/25-2
OD_ADD: +2.50
OS_SPHERE: -0.25
OD_CYLINDER: +0.50
OD_VA1: 20/25-2
OS_ADD: +2.75
OD_ADD: +2.75
OS_VA1: 20/25-
OU_VA: 20-1
OS_SPHERE: PLANO
OD_SPHERE: -0.50
OS_CYLINDER: +0.50
OD_CYLINDER: +0.50
OS_CYLINDER: +0.50
OS_ADD: +2.50
OD_VA1: 20/25-2
OS_SPHERE: -1.00
OS_VA2: 20/20
OD_AXIS: 005
OS_AXIS: 005
OD_ADD: +2.75
OS_ADD: +2.75
OD_ADD: +2.75
OS_VA1: 20/20
OD_AXIS: 180
OS_AXIS: 120
OS_AXIS: 5
OD_CYLINDER: SPHERE
OD_SPHERE: -0.50
OD_VA1: 20/25
OD_VA2: 20/20
OD_VA1: 20/25
OS_CYLINDER: +0.50
OS_CYLINDER: +0.50
OS_VA2: 20/20(J1+)
OD_SPHERE: PLANO
OD_VA2: 20/20(J1+)
OD_CYLINDER: SPHERE
OS_VA1: 20/20-2
OS_SPHERE: -0.50
OS_ADD: +2.75
OS_CYLINDER: SPHERE
OS_AXIS: 120
OD_SPHERE: -0.50
OD_CYLINDER: +0.50

## 2024-11-20 ASSESSMENT — REFRACTION_AUTOREFRACTION
OD_SPHERE: -0.25
OD_CYLINDER: +0.25
OS_SPHERE: -0.75
OD_AXIS: 020
OS_AXIS: 133
OS_CYLINDER: +0.75

## 2024-11-20 ASSESSMENT — VISUAL ACUITY
OD_BCVA: 20/25
OS_BCVA: 20/40

## 2024-12-19 ENCOUNTER — OFFICE (OUTPATIENT)
Facility: LOCATION | Age: 74
Setting detail: OPHTHALMOLOGY
End: 2024-12-19
Payer: MEDICARE

## 2024-12-19 DIAGNOSIS — H52.13: ICD-10-CM

## 2024-12-19 DIAGNOSIS — H26.493: ICD-10-CM

## 2024-12-19 DIAGNOSIS — H10.433: ICD-10-CM

## 2024-12-19 DIAGNOSIS — H16.223: ICD-10-CM

## 2024-12-19 DIAGNOSIS — H52.12: ICD-10-CM

## 2024-12-19 PROCEDURE — 92015 DETERMINE REFRACTIVE STATE: CPT | Mod: GA | Performed by: OPTOMETRIST

## 2024-12-19 PROCEDURE — 99213 OFFICE O/P EST LOW 20 MIN: CPT | Performed by: OPTOMETRIST

## 2024-12-19 ASSESSMENT — LID POSITION - COMMENTS
OS_COMMENTS: 2.5MM
OD_COMMENTS: 2MM

## 2024-12-19 ASSESSMENT — CONFRONTATIONAL VISUAL FIELD TEST (CVF)
OD_FINDINGS: FULL
OS_FINDINGS: FULL

## 2024-12-19 ASSESSMENT — SUPERFICIAL PUNCTATE KERATITIS (SPK)
OS_SPK: T
OD_SPK: T

## 2024-12-19 ASSESSMENT — LID EXAM ASSESSMENTS
OD_COMMENTS: ROSACEA
OS_COMMENTS: ROSACEA
OD_BLEPHARITIS: RUL T
OS_BLEPHARITIS: LUL T

## 2024-12-19 ASSESSMENT — LID POSITION - DERMATOCHALASIS
OD_DERMATOCHALASIS: T
OS_DERMATOCHALASIS: T

## 2024-12-19 ASSESSMENT — LID POSITION - PTOSIS
OD_PTOSIS: RUL 1+
OS_PTOSIS: LUL 1+

## 2024-12-19 ASSESSMENT — TONOMETRY
OD_IOP_MMHG: 13
OS_IOP_MMHG: 13

## 2024-12-24 PROBLEM — H52.13 MYOPIA; BOTH EYES: Status: ACTIVE | Noted: 2024-12-19

## 2024-12-24 PROBLEM — H02.402 PTOSIS OF EYELID, UNSPECIFIED; LEFT EYE: Status: ACTIVE | Noted: 2024-12-19

## 2024-12-24 ASSESSMENT — REFRACTION_MANIFEST
OD_AXIS: 5
OS_VA1: 20/25-
OS_ADD: +2.75
OU_VA: 20-1
OD_SPHERE: -0.50
OS_SPHERE: -0.50
OD_VA1: 20/25-2
OS_CYLINDER: +0.50
OD_VA2: 20/20
OD_VA1: 20/25-2
OS_VA1: 20/20-2
OD_ADD: +2.75
OS_SPHERE: -1.00
OD_SPHERE: -0.50
OS_ADD: +2.75
OS_CYLINDER: SPHERE
OD_ADD: +2.75
OS_VA2: 20/20
OD_AXIS: 180
OD_ADD: +2.75
OS_SPHERE: -1.50
OD_VA1: 20/25
OS_VA1: 20/20
OD_CYLINDER: +0.50
OS_VA1: 20/25-2
OD_CYLINDER: SPHERE
OD_VA2: 20/20(J1+)
OS_AXIS: 005
OD_CYLINDER: +0.50
OS_ADD: +2.75
OU_VA: 20/25
OS_VA1: 20/25
OS_VA2: 20/20(J1+)
OD_SPHERE: PLANO
OS_AXIS: 120
OS_AXIS: 5
OS_CYLINDER: +0.50
OD_CYLINDER: +0.75
OD_SPHERE: -0.50
OD_CYLINDER: +0.50
OD_AXIS: 005
OD_SPHERE: -0.50
OD_VA1: 20/25
OS_CYLINDER: +0.75
OS_CYLINDER: +0.50
OS_SPHERE: PLANO
OD_VA1: 20/20-2
OS_SPHERE: -0.50
OS_AXIS: 135
OD_AXIS: 030

## 2024-12-24 ASSESSMENT — REFRACTION_AUTOREFRACTION
OD_AXIS: 024
OS_CYLINDER: +0.75
OS_AXIS: 133
OD_SPHERE: -0.25
OD_CYLINDER: +0.75
OS_SPHERE: -0.50

## 2024-12-24 ASSESSMENT — VISUAL ACUITY
OS_BCVA: 20/30-1
OD_BCVA: 20/25

## 2025-02-18 ENCOUNTER — APPOINTMENT (OUTPATIENT)
Dept: UROLOGY | Facility: CLINIC | Age: 75
End: 2025-02-18
Payer: MEDICARE

## 2025-02-18 ENCOUNTER — APPOINTMENT (OUTPATIENT)
Dept: UROLOGY | Facility: CLINIC | Age: 75
End: 2025-02-18

## 2025-02-18 VITALS
RESPIRATION RATE: 17 BRPM | DIASTOLIC BLOOD PRESSURE: 63 MMHG | BODY MASS INDEX: 28.12 KG/M2 | HEIGHT: 66 IN | WEIGHT: 175 LBS | SYSTOLIC BLOOD PRESSURE: 129 MMHG | HEART RATE: 74 BPM | TEMPERATURE: 97.8 F

## 2025-02-18 PROCEDURE — 99214 OFFICE O/P EST MOD 30 MIN: CPT | Mod: 25

## 2025-02-18 PROCEDURE — 99213 OFFICE O/P EST LOW 20 MIN: CPT

## 2025-02-18 RX ORDER — NYSTATIN 100000 U/G
100000 OINTMENT TOPICAL 3 TIMES DAILY
Qty: 1 | Refills: 0 | Status: ACTIVE | COMMUNITY
Start: 2025-02-18 | End: 1900-01-01

## 2025-02-20 DIAGNOSIS — N40.1 BENIGN PROSTATIC HYPERPLASIA WITH LOWER URINARY TRACT SYMPMS: ICD-10-CM

## 2025-02-20 DIAGNOSIS — N13.8 BENIGN PROSTATIC HYPERPLASIA WITH LOWER URINARY TRACT SYMPMS: ICD-10-CM

## 2025-03-03 ENCOUNTER — OUTPATIENT (OUTPATIENT)
Dept: OUTPATIENT SERVICES | Facility: HOSPITAL | Age: 75
LOS: 1 days | End: 2025-03-03
Payer: MEDICARE

## 2025-03-03 VITALS
SYSTOLIC BLOOD PRESSURE: 139 MMHG | TEMPERATURE: 98 F | HEART RATE: 74 BPM | RESPIRATION RATE: 18 BRPM | WEIGHT: 173.94 LBS | OXYGEN SATURATION: 96 % | HEIGHT: 66 IN | DIASTOLIC BLOOD PRESSURE: 75 MMHG

## 2025-03-03 DIAGNOSIS — N28.89 OTHER SPECIFIED DISORDERS OF KIDNEY AND URETER: Chronic | ICD-10-CM

## 2025-03-03 DIAGNOSIS — N13.8 OTHER OBSTRUCTIVE AND REFLUX UROPATHY: ICD-10-CM

## 2025-03-03 DIAGNOSIS — N40.1 BENIGN PROSTATIC HYPERPLASIA WITH LOWER URINARY TRACT SYMPTOMS: ICD-10-CM

## 2025-03-03 DIAGNOSIS — Z01.818 ENCOUNTER FOR OTHER PREPROCEDURAL EXAMINATION: ICD-10-CM

## 2025-03-03 DIAGNOSIS — N40.0 BENIGN PROSTATIC HYPERPLASIA WITHOUT LOWER URINARY TRACT SYMPTOMS: ICD-10-CM

## 2025-03-03 DIAGNOSIS — Z98.890 OTHER SPECIFIED POSTPROCEDURAL STATES: Chronic | ICD-10-CM

## 2025-03-03 DIAGNOSIS — Z98.49 CATARACT EXTRACTION STATUS, UNSPECIFIED EYE: Chronic | ICD-10-CM

## 2025-03-03 LAB
ANION GAP SERPL CALC-SCNC: 12 MMOL/L — SIGNIFICANT CHANGE UP (ref 5–17)
BLD GP AB SCN SERPL QL: NEGATIVE — SIGNIFICANT CHANGE UP
BUN SERPL-MCNC: 27 MG/DL — HIGH (ref 7–23)
CALCIUM SERPL-MCNC: 9.8 MG/DL — SIGNIFICANT CHANGE UP (ref 8.4–10.5)
CHLORIDE SERPL-SCNC: 102 MMOL/L — SIGNIFICANT CHANGE UP (ref 96–108)
CO2 SERPL-SCNC: 24 MMOL/L — SIGNIFICANT CHANGE UP (ref 22–31)
CREAT SERPL-MCNC: 0.93 MG/DL — SIGNIFICANT CHANGE UP (ref 0.5–1.3)
EGFR: 85 ML/MIN/1.73M2 — SIGNIFICANT CHANGE UP
EGFR: 85 ML/MIN/1.73M2 — SIGNIFICANT CHANGE UP
GLUCOSE SERPL-MCNC: 157 MG/DL — HIGH (ref 70–99)
HCT VFR BLD CALC: 31.1 % — LOW (ref 39–50)
HGB BLD-MCNC: 10 G/DL — LOW (ref 13–17)
MCHC RBC-ENTMCNC: 29.4 PG — SIGNIFICANT CHANGE UP (ref 27–34)
MCHC RBC-ENTMCNC: 32.2 G/DL — SIGNIFICANT CHANGE UP (ref 32–36)
MCV RBC AUTO: 91.5 FL — SIGNIFICANT CHANGE UP (ref 80–100)
NRBC BLD AUTO-RTO: 0 /100 WBCS — SIGNIFICANT CHANGE UP (ref 0–0)
PLATELET # BLD AUTO: 79 K/UL — LOW (ref 150–400)
POTASSIUM SERPL-MCNC: 3.7 MMOL/L — SIGNIFICANT CHANGE UP (ref 3.5–5.3)
POTASSIUM SERPL-SCNC: 3.7 MMOL/L — SIGNIFICANT CHANGE UP (ref 3.5–5.3)
RBC # BLD: 3.4 M/UL — LOW (ref 4.2–5.8)
RBC # FLD: 22.8 % — HIGH (ref 10.3–14.5)
RH IG SCN BLD-IMP: POSITIVE — SIGNIFICANT CHANGE UP
SODIUM SERPL-SCNC: 138 MMOL/L — SIGNIFICANT CHANGE UP (ref 135–145)
WBC # BLD: 4.34 K/UL — SIGNIFICANT CHANGE UP (ref 3.8–10.5)
WBC # FLD AUTO: 4.34 K/UL — SIGNIFICANT CHANGE UP (ref 3.8–10.5)

## 2025-03-03 PROCEDURE — 83036 HEMOGLOBIN GLYCOSYLATED A1C: CPT

## 2025-03-03 PROCEDURE — 80048 BASIC METABOLIC PNL TOTAL CA: CPT

## 2025-03-03 PROCEDURE — 86850 RBC ANTIBODY SCREEN: CPT

## 2025-03-03 PROCEDURE — G0463: CPT

## 2025-03-03 PROCEDURE — 86901 BLOOD TYPING SEROLOGIC RH(D): CPT

## 2025-03-03 PROCEDURE — 86900 BLOOD TYPING SEROLOGIC ABO: CPT

## 2025-03-03 PROCEDURE — 85027 COMPLETE CBC AUTOMATED: CPT

## 2025-03-03 PROCEDURE — 87086 URINE CULTURE/COLONY COUNT: CPT

## 2025-03-03 NOTE — H&P PST ADULT - NSICDXPASTMEDICALHX_GEN_ALL_CORE_FT
PAST MEDICAL HISTORY:  Enlarged prostate     History of BPH     Hypertension     Renal mass     Type 2 diabetes mellitus

## 2025-03-03 NOTE — H&P PST ADULT - PROBLEM SELECTOR PLAN 1
Planned for laser enucleation of the prostate with morcellation on 3/24/25. Surgical instructions reviewed and provided to patient. CBC, BMP, T&S, HgbA1C and Urine Culture obtained at [facility].    Last dose of Metformin: 3/23

## 2025-03-03 NOTE — H&P PST ADULT - NSICDXPASTSURGICALHX_GEN_ALL_CORE_FT
PAST SURGICAL HISTORY:  H/O cataract extraction     History of cerebral angiography     Renal mass     S/P colonoscopy

## 2025-03-03 NOTE — H&P PST ADULT - ASSESSMENT
Denies loose/cracked teeth  Upper and lower full removable dentures  Mallampati II    CAPRINI SCORE    AGE RELATED RISK FACTORS                                                             [ ] Age 41-60 years                                            (1 Point)  [ ] Age: 61-74 years                                           (2 Points)                 [x] Age= 75 years                                                (3 Points)             DISEASE RELATED RISK FACTORS                                                       [ ] Edema in the lower extremities                 (1 Point)                     [ ] Varicose veins                                               (1 Point)                                 [x] BMI > 25 Kg/m2                                            (1 Point)                                  [ ] Serious infection (ie PNA)                            (1 Point)                     [ ] Lung disease ( COPD, Emphysema)            (1 Point)                                                                          [ ] Acute myocardial infarction                         (1 Point)                  [ ] Congestive heart failure (in the previous month)  (1 Point)         [ ] Inflammatory bowel disease                            (1 Point)                  [ ] Central venous access, PICC or Port               (2 points)       (within the last month)                                                                [ ] Stroke (in the previous month)                        (5 Points)    [ ] Previous or present malignancy                       (2 points)                                                                                                                                                         HEMATOLOGY RELATED FACTORS                                                         [ ] Prior episodes of VTE                                     (3 Points)                     [ ] Positive family history for VTE                      (3 Points)                  [ ] Prothrombin 78800 A                                     (3 Points)                     [ ] Factor V Leiden                                                (3 Points)                        [ ] Lupus anticoagulants                                      (3 Points)                                                           [ ] Anticardiolipin antibodies                              (3 Points)                                                       [ ] High homocysteine in the blood                   (3 Points)                                             [ ] Other congenital or acquired thrombophilia      (3 Points)                                                [ ] Heparin induced thrombocytopenia                  (3 Points)                                        MOBILITY RELATED FACTORS  [ ] Bed rest                                                         (1 Point)  [ ] Plaster cast                                                    (2 points)  [ ] Bed bound for more than 72 hours           (2 Points)    GENDER SPECIFIC FACTORS  [ ] Pregnancy or had a baby within the last month   (1 Point)  [ ] Post-partum < 6 weeks                                   (1 Point)  [ ] Hormonal therapy  or oral contraception   (1 Point)  [ ] History of pregnancy complications              (1 point)  [ ] Unexplained or recurrent              (1 Point)    OTHER RISK FACTORS                                           (1 Point)  [ ] BMI >40, smoking, diabetes requiring insulin, chemotherapy  blood transfusions and length of surgery over 2 hours    SURGERY RELATED RISK FACTORS  [ ]  Section within the last month     (1 Point)  [ ] Minor surgery                                                  (1 Point)  [ ] Arthroscopic surgery                                       (2 Points)  [x] Planned major surgery lasting more            (2 Points)      than 45 minutes     [ ] Elective hip or knee joint replacement       (5 points)       surgery                                                TRAUMA RELATED RISK FACTORS  [ ] Fracture of the hip, pelvis, or leg                       (5 Points)  [ ] Spinal cord injury resulting in paralysis             (5 points)       (in the previous month)    [ ] Paralysis  (less than 1 month)                             (5 Points)  [ ] Multiple Trauma within 1 month                        (5 Points)    Total Score [    6    ]    Caprini Score 0-2: Low Risk, NO VTE prophylaxis required for most patients, encourage ambulation  Caprini Score 3-6: Moderate Risk , pharmacologic VTE prophylaxis is indicated for most patients (in the absence of contraindications)  Caprini Score Greater than or =7: High risk, pharmocologic VTE prophylaxis indicated for most patients (in the absence of contraindications)

## 2025-03-03 NOTE — H&P PST ADULT - HISTORY OF PRESENT ILLNESS
74 year old male with PMHx of HTN, T2DM, cerebral aneurysm (5/2023, s/p ACOM device and stent - no longer required to follow up per daughter), thrombocytopenia, enlarged prostate presents to PST for laser enucleation of the prostate with morcellation on 3/24/25. Patient denies fever, chills, SOB, chest pain.

## 2025-03-04 LAB
A1C WITH ESTIMATED AVERAGE GLUCOSE RESULT: 6.6 % — HIGH (ref 4–5.6)
CULTURE RESULTS: SIGNIFICANT CHANGE UP
ESTIMATED AVERAGE GLUCOSE: 143 MG/DL — HIGH (ref 68–114)
SPECIMEN SOURCE: SIGNIFICANT CHANGE UP

## 2025-03-19 RX ORDER — SULFAMETHOXAZOLE AND TRIMETHOPRIM 800; 160 MG/1; MG/1
800-160 TABLET ORAL
Qty: 6 | Refills: 0 | Status: ACTIVE | COMMUNITY
Start: 2025-03-19 | End: 1900-01-01

## 2025-03-24 ENCOUNTER — APPOINTMENT (OUTPATIENT)
Dept: UROLOGY | Facility: HOSPITAL | Age: 75
End: 2025-03-24

## 2025-03-24 ENCOUNTER — RESULT REVIEW (OUTPATIENT)
Age: 75
End: 2025-03-24

## 2025-03-24 ENCOUNTER — INPATIENT (INPATIENT)
Facility: HOSPITAL | Age: 75
LOS: 0 days | Discharge: HOME | DRG: 694 | End: 2025-03-25
Attending: STUDENT IN AN ORGANIZED HEALTH CARE EDUCATION/TRAINING PROGRAM | Admitting: STUDENT IN AN ORGANIZED HEALTH CARE EDUCATION/TRAINING PROGRAM
Payer: MEDICARE

## 2025-03-24 ENCOUNTER — TRANSCRIPTION ENCOUNTER (OUTPATIENT)
Age: 75
End: 2025-03-24

## 2025-03-24 VITALS
OXYGEN SATURATION: 97 % | TEMPERATURE: 98 F | SYSTOLIC BLOOD PRESSURE: 122 MMHG | DIASTOLIC BLOOD PRESSURE: 73 MMHG | RESPIRATION RATE: 18 BRPM | HEIGHT: 65.98 IN | WEIGHT: 173.94 LBS | HEART RATE: 58 BPM

## 2025-03-24 DIAGNOSIS — N28.89 OTHER SPECIFIED DISORDERS OF KIDNEY AND URETER: Chronic | ICD-10-CM

## 2025-03-24 DIAGNOSIS — Z98.890 OTHER SPECIFIED POSTPROCEDURAL STATES: Chronic | ICD-10-CM

## 2025-03-24 DIAGNOSIS — N13.8 OTHER OBSTRUCTIVE AND REFLUX UROPATHY: ICD-10-CM

## 2025-03-24 DIAGNOSIS — Z98.49 CATARACT EXTRACTION STATUS, UNSPECIFIED EYE: Chronic | ICD-10-CM

## 2025-03-24 DIAGNOSIS — N40.1 BENIGN PROSTATIC HYPERPLASIA WITH LOWER URINARY TRACT SYMPTOMS: ICD-10-CM

## 2025-03-24 LAB
ANION GAP SERPL CALC-SCNC: 13 MMOL/L — SIGNIFICANT CHANGE UP (ref 5–17)
BUN SERPL-MCNC: 25 MG/DL — HIGH (ref 7–23)
CALCIUM SERPL-MCNC: 8.2 MG/DL — LOW (ref 8.4–10.5)
CHLORIDE SERPL-SCNC: 105 MMOL/L — SIGNIFICANT CHANGE UP (ref 96–108)
CO2 SERPL-SCNC: 20 MMOL/L — LOW (ref 22–31)
CREAT SERPL-MCNC: 1.31 MG/DL — HIGH (ref 0.5–1.3)
EGFR: 56 ML/MIN/1.73M2 — LOW
EGFR: 56 ML/MIN/1.73M2 — LOW
GLUCOSE BLDC GLUCOMTR-MCNC: 131 MG/DL — HIGH (ref 70–99)
GLUCOSE BLDC GLUCOMTR-MCNC: 146 MG/DL — HIGH (ref 70–99)
GLUCOSE BLDC GLUCOMTR-MCNC: 152 MG/DL — HIGH (ref 70–99)
GLUCOSE BLDC GLUCOMTR-MCNC: 193 MG/DL — HIGH (ref 70–99)
GLUCOSE SERPL-MCNC: 174 MG/DL — HIGH (ref 70–99)
HCT VFR BLD CALC: 27.5 % — LOW (ref 39–50)
HCT VFR BLD CALC: 32.5 % — LOW (ref 39–50)
HGB BLD-MCNC: 10.3 G/DL — LOW (ref 13–17)
HGB BLD-MCNC: 8.9 G/DL — LOW (ref 13–17)
MCHC RBC-ENTMCNC: 29.9 PG — SIGNIFICANT CHANGE UP (ref 27–34)
MCHC RBC-ENTMCNC: 30 PG — SIGNIFICANT CHANGE UP (ref 27–34)
MCHC RBC-ENTMCNC: 31.7 G/DL — LOW (ref 32–36)
MCHC RBC-ENTMCNC: 32.4 G/DL — SIGNIFICANT CHANGE UP (ref 32–36)
MCV RBC AUTO: 92.6 FL — SIGNIFICANT CHANGE UP (ref 80–100)
MCV RBC AUTO: 94.2 FL — SIGNIFICANT CHANGE UP (ref 80–100)
NRBC BLD AUTO-RTO: 0 /100 WBCS — SIGNIFICANT CHANGE UP (ref 0–0)
NRBC BLD AUTO-RTO: 0 /100 WBCS — SIGNIFICANT CHANGE UP (ref 0–0)
PLATELET # BLD AUTO: 81 K/UL — LOW (ref 150–400)
PLATELET # BLD AUTO: 96 K/UL — LOW (ref 150–400)
POTASSIUM SERPL-MCNC: 4.7 MMOL/L — SIGNIFICANT CHANGE UP (ref 3.5–5.3)
POTASSIUM SERPL-SCNC: 4.7 MMOL/L — SIGNIFICANT CHANGE UP (ref 3.5–5.3)
RBC # BLD: 2.97 M/UL — LOW (ref 4.2–5.8)
RBC # BLD: 3.45 M/UL — LOW (ref 4.2–5.8)
RBC # FLD: 21.9 % — HIGH (ref 10.3–14.5)
RBC # FLD: 22.1 % — HIGH (ref 10.3–14.5)
SODIUM SERPL-SCNC: 138 MMOL/L — SIGNIFICANT CHANGE UP (ref 135–145)
WBC # BLD: 2.54 K/UL — LOW (ref 3.8–10.5)
WBC # BLD: 2.64 K/UL — LOW (ref 3.8–10.5)
WBC # FLD AUTO: 2.54 K/UL — LOW (ref 3.8–10.5)
WBC # FLD AUTO: 2.64 K/UL — LOW (ref 3.8–10.5)

## 2025-03-24 PROCEDURE — 52649 PROSTATE LASER ENUCLEATION: CPT

## 2025-03-24 PROCEDURE — 88305 TISSUE EXAM BY PATHOLOGIST: CPT | Mod: 26

## 2025-03-24 DEVICE — MORCELLATOR ROTATION 4.8MM: Type: IMPLANTABLE DEVICE | Status: FUNCTIONAL

## 2025-03-24 DEVICE — GUIDEWIRE SENSOR DUAL-FLEX NITINOL STRAIGHT .035" X 150CM: Type: IMPLANTABLE DEVICE | Status: FUNCTIONAL

## 2025-03-24 DEVICE — LASER FIBER SOLTIVE 550: Type: IMPLANTABLE DEVICE | Status: FUNCTIONAL

## 2025-03-24 RX ORDER — HYDROCHLOROTHIAZIDE 50 MG/1
1 TABLET ORAL
Refills: 0 | DISCHARGE

## 2025-03-24 RX ORDER — FOLIC ACID 1 MG/1
1 TABLET ORAL DAILY
Refills: 0 | Status: DISCONTINUED | OUTPATIENT
Start: 2025-03-24 | End: 2025-03-25

## 2025-03-24 RX ORDER — HEPARIN SODIUM 1000 [USP'U]/ML
5000 INJECTION INTRAVENOUS; SUBCUTANEOUS EVERY 8 HOURS
Refills: 0 | Status: DISCONTINUED | OUTPATIENT
Start: 2025-03-24 | End: 2025-03-25

## 2025-03-24 RX ORDER — LOSARTAN POTASSIUM 100 MG/1
100 TABLET, FILM COATED ORAL DAILY
Refills: 0 | Status: DISCONTINUED | OUTPATIENT
Start: 2025-03-24 | End: 2025-03-25

## 2025-03-24 RX ORDER — METOPROLOL SUCCINATE 50 MG/1
100 TABLET, EXTENDED RELEASE ORAL
Refills: 0 | Status: DISCONTINUED | OUTPATIENT
Start: 2025-03-24 | End: 2025-03-25

## 2025-03-24 RX ORDER — SODIUM CHLORIDE 9 G/1000ML
1000 INJECTION, SOLUTION INTRAVENOUS
Refills: 0 | Status: DISCONTINUED | OUTPATIENT
Start: 2025-03-24 | End: 2025-03-25

## 2025-03-24 RX ORDER — FINASTERIDE 1 MG/1
5 TABLET, FILM COATED ORAL DAILY
Refills: 0 | Status: DISCONTINUED | OUTPATIENT
Start: 2025-03-24 | End: 2025-03-25

## 2025-03-24 RX ORDER — LIDOCAINE HCL/PF 10 MG/ML
0.2 VIAL (ML) INJECTION ONCE
Refills: 0 | Status: COMPLETED | OUTPATIENT
Start: 2025-03-24 | End: 2025-03-24

## 2025-03-24 RX ORDER — FENTANYL CITRATE-0.9 % NACL/PF 100MCG/2ML
25 SYRINGE (ML) INTRAVENOUS
Refills: 0 | Status: DISCONTINUED | OUTPATIENT
Start: 2025-03-24 | End: 2025-03-24

## 2025-03-24 RX ORDER — DEXTROSE 50 % IN WATER 50 %
25 SYRINGE (ML) INTRAVENOUS ONCE
Refills: 0 | Status: DISCONTINUED | OUTPATIENT
Start: 2025-03-24 | End: 2025-03-25

## 2025-03-24 RX ORDER — CEFAZOLIN SODIUM IN 0.9 % NACL 3 G/100 ML
2000 INTRAVENOUS SOLUTION, PIGGYBACK (ML) INTRAVENOUS ONCE
Refills: 0 | Status: COMPLETED | OUTPATIENT
Start: 2025-03-24 | End: 2025-03-24

## 2025-03-24 RX ORDER — CALCIUM GLUCONATE 20 MG/ML
4 INJECTION, SOLUTION INTRAVENOUS ONCE
Refills: 0 | Status: DISCONTINUED | OUTPATIENT
Start: 2025-03-24 | End: 2025-03-24

## 2025-03-24 RX ORDER — INSULIN LISPRO 100 U/ML
INJECTION, SOLUTION INTRAVENOUS; SUBCUTANEOUS AT BEDTIME
Refills: 0 | Status: DISCONTINUED | OUTPATIENT
Start: 2025-03-24 | End: 2025-03-25

## 2025-03-24 RX ORDER — DEXTROSE 50 % IN WATER 50 %
12.5 SYRINGE (ML) INTRAVENOUS ONCE
Refills: 0 | Status: DISCONTINUED | OUTPATIENT
Start: 2025-03-24 | End: 2025-03-25

## 2025-03-24 RX ORDER — GLUCAGON 3 MG/1
1 POWDER NASAL ONCE
Refills: 0 | Status: DISCONTINUED | OUTPATIENT
Start: 2025-03-24 | End: 2025-03-25

## 2025-03-24 RX ORDER — OLMESARTAN MEDOXOMIL 5 MG/1
1 TABLET, FILM COATED ORAL
Refills: 0 | DISCHARGE

## 2025-03-24 RX ORDER — DEXTROSE 50 % IN WATER 50 %
15 SYRINGE (ML) INTRAVENOUS ONCE
Refills: 0 | Status: DISCONTINUED | OUTPATIENT
Start: 2025-03-24 | End: 2025-03-25

## 2025-03-24 RX ORDER — FINASTERIDE 1 MG/1
1 TABLET, FILM COATED ORAL
Refills: 0 | DISCHARGE

## 2025-03-24 RX ORDER — TRANEXAMIC ACID 1000 MG/10
1000 AMPUL (ML) INTRAVENOUS ONCE
Refills: 0 | Status: COMPLETED | OUTPATIENT
Start: 2025-03-24 | End: 2025-03-24

## 2025-03-24 RX ORDER — AMPICILLIN SODIUM 1 G/1
2 INJECTION, POWDER, FOR SOLUTION INTRAMUSCULAR; INTRAVENOUS EVERY 6 HOURS
Refills: 0 | Status: DISCONTINUED | OUTPATIENT
Start: 2025-03-24 | End: 2025-03-25

## 2025-03-24 RX ORDER — INSULIN LISPRO 100 U/ML
INJECTION, SOLUTION INTRAVENOUS; SUBCUTANEOUS
Refills: 0 | Status: DISCONTINUED | OUTPATIENT
Start: 2025-03-24 | End: 2025-03-25

## 2025-03-24 RX ORDER — TAMSULOSIN HYDROCHLORIDE 0.4 MG/1
0.4 CAPSULE ORAL
Refills: 0 | Status: DISCONTINUED | OUTPATIENT
Start: 2025-03-24 | End: 2025-03-25

## 2025-03-24 RX ORDER — SODIUM CHLORIDE 9 G/1000ML
1000 INJECTION, SOLUTION INTRAVENOUS
Refills: 0 | Status: DISCONTINUED | OUTPATIENT
Start: 2025-03-24 | End: 2025-03-24

## 2025-03-24 RX ORDER — SENNA 187 MG
2 TABLET ORAL AT BEDTIME
Refills: 0 | Status: DISCONTINUED | OUTPATIENT
Start: 2025-03-24 | End: 2025-03-25

## 2025-03-24 RX ORDER — ACETAMINOPHEN 500 MG/5ML
975 LIQUID (ML) ORAL EVERY 6 HOURS
Refills: 0 | Status: DISCONTINUED | OUTPATIENT
Start: 2025-03-24 | End: 2025-03-25

## 2025-03-24 RX ORDER — FUROSEMIDE 10 MG/ML
10 INJECTION INTRAMUSCULAR; INTRAVENOUS ONCE
Refills: 0 | Status: COMPLETED | OUTPATIENT
Start: 2025-03-24 | End: 2025-03-24

## 2025-03-24 RX ORDER — DESMOPRESSIN ACETATE 4 UG/ML
16 INJECTION INTRAVENOUS ONCE
Refills: 0 | Status: COMPLETED | OUTPATIENT
Start: 2025-03-24 | End: 2025-03-24

## 2025-03-24 RX ORDER — ONDANSETRON HCL/PF 4 MG/2 ML
4 VIAL (ML) INJECTION ONCE
Refills: 0 | Status: DISCONTINUED | OUTPATIENT
Start: 2025-03-24 | End: 2025-03-24

## 2025-03-24 RX ORDER — TAMSULOSIN HYDROCHLORIDE 0.4 MG/1
0.8 CAPSULE ORAL AT BEDTIME
Refills: 0 | Status: DISCONTINUED | OUTPATIENT
Start: 2025-03-24 | End: 2025-03-24

## 2025-03-24 RX ORDER — CALCIUM GLUCONATE 20 MG/ML
2 INJECTION, SOLUTION INTRAVENOUS
Refills: 0 | Status: COMPLETED | OUTPATIENT
Start: 2025-03-24 | End: 2025-03-24

## 2025-03-24 RX ADMIN — INSULIN LISPRO 1: 100 INJECTION, SOLUTION INTRAVENOUS; SUBCUTANEOUS at 16:21

## 2025-03-24 RX ADMIN — AMPICILLIN SODIUM 200 GRAM(S): 1 INJECTION, POWDER, FOR SOLUTION INTRAMUSCULAR; INTRAVENOUS at 14:11

## 2025-03-24 RX ADMIN — METOPROLOL SUCCINATE 100 MILLIGRAM(S): 50 TABLET, EXTENDED RELEASE ORAL at 18:32

## 2025-03-24 RX ADMIN — Medication 3 MILLILITER(S): at 05:43

## 2025-03-24 RX ADMIN — CALCIUM GLUCONATE 100 GRAM(S): 20 INJECTION, SOLUTION INTRAVENOUS at 22:06

## 2025-03-24 RX ADMIN — TAMSULOSIN HYDROCHLORIDE 0.4 MILLIGRAM(S): 0.4 CAPSULE ORAL at 17:56

## 2025-03-24 RX ADMIN — Medication 975 MILLIGRAM(S): at 18:56

## 2025-03-24 RX ADMIN — FINASTERIDE 5 MILLIGRAM(S): 1 TABLET, FILM COATED ORAL at 18:32

## 2025-03-24 RX ADMIN — CALCIUM GLUCONATE 100 GRAM(S): 20 INJECTION, SOLUTION INTRAVENOUS at 21:26

## 2025-03-24 RX ADMIN — Medication 975 MILLIGRAM(S): at 23:34

## 2025-03-24 RX ADMIN — HEPARIN SODIUM 5000 UNIT(S): 1000 INJECTION INTRAVENOUS; SUBCUTANEOUS at 21:25

## 2025-03-24 RX ADMIN — HEPARIN SODIUM 5000 UNIT(S): 1000 INJECTION INTRAVENOUS; SUBCUTANEOUS at 14:11

## 2025-03-24 RX ADMIN — Medication 220 MILLIGRAM(S): at 17:57

## 2025-03-24 RX ADMIN — FUROSEMIDE 10 MILLIGRAM(S): 10 INJECTION INTRAMUSCULAR; INTRAVENOUS at 11:06

## 2025-03-24 RX ADMIN — AMPICILLIN SODIUM 200 GRAM(S): 1 INJECTION, POWDER, FOR SOLUTION INTRAMUSCULAR; INTRAVENOUS at 21:25

## 2025-03-24 RX ADMIN — DESMOPRESSIN ACETATE 108 MICROGRAM(S): 4 INJECTION INTRAVENOUS at 21:26

## 2025-03-24 RX ADMIN — Medication 75 MILLILITER(S): at 16:22

## 2025-03-24 RX ADMIN — Medication 975 MILLIGRAM(S): at 17:56

## 2025-03-24 NOTE — PRE-ANESTHESIA EVALUATION ADULT - BP NONINVASIVE DIASTOLIC (MM HG)
----- Message from Brant Sawyer DO sent at 2/11/2020  3:42 PM CST -----  BV- metrogel 0.75% BID for 7 days then twice weekly for 3 months   71

## 2025-03-24 NOTE — PATIENT PROFILE ADULT - PRO INTERPRETER NEED 2
Detail Level: Zone
Sunscreen Recommendations: Broad-spectrum daily sunscreen with SPF 30 or higher. Ideally a brand with zinc oxide such as Elta MD or CeraVe AM.
Detail Level: Detailed
English

## 2025-03-24 NOTE — PROGRESS NOTE ADULT - ASSESSMENT
A/P: 74y Male s/p laser enucleation of prostate    Repeat CBC/BMP  Ampicillin  Leave traction until PM  Clamp CBI in AM  DM Diet/ISS  DVT prophylaxis/OOB  Incentive spirometry  Strict I&O's  Analgesia and antiemetics as needed  Home with rocha in AM

## 2025-03-24 NOTE — PATIENT PROFILE ADULT - FALL HARM RISK - UNIVERSAL INTERVENTIONS
Bed in lowest position, wheels locked, appropriate side rails in place/Call bell, personal items and telephone in reach/Instruct patient to call for assistance before getting out of bed or chair/Non-slip footwear when patient is out of bed/[place] to call system/Physically safe environment - no spills, clutter or unnecessary equipment/Purposeful Proactive Rounding/Room/bathroom lighting operational, light cord in reach

## 2025-03-25 ENCOUNTER — TRANSCRIPTION ENCOUNTER (OUTPATIENT)
Age: 75
End: 2025-03-25

## 2025-03-25 VITALS — TEMPERATURE: 98 F | RESPIRATION RATE: 18 BRPM | OXYGEN SATURATION: 95 %

## 2025-03-25 LAB
ALBUMIN SERPL ELPH-MCNC: 3.9 G/DL — SIGNIFICANT CHANGE UP (ref 3.3–5)
ALP SERPL-CCNC: 46 U/L — SIGNIFICANT CHANGE UP (ref 40–120)
ALT FLD-CCNC: 12 U/L — SIGNIFICANT CHANGE UP (ref 10–45)
ANION GAP SERPL CALC-SCNC: 12 MMOL/L — SIGNIFICANT CHANGE UP (ref 5–17)
AST SERPL-CCNC: 11 U/L — SIGNIFICANT CHANGE UP (ref 10–40)
BILIRUB SERPL-MCNC: 0.5 MG/DL — SIGNIFICANT CHANGE UP (ref 0.2–1.2)
BUN SERPL-MCNC: 22 MG/DL — SIGNIFICANT CHANGE UP (ref 7–23)
CALCIUM SERPL-MCNC: 8.9 MG/DL — SIGNIFICANT CHANGE UP (ref 8.4–10.5)
CHLORIDE SERPL-SCNC: 105 MMOL/L — SIGNIFICANT CHANGE UP (ref 96–108)
CO2 SERPL-SCNC: 23 MMOL/L — SIGNIFICANT CHANGE UP (ref 22–31)
CREAT SERPL-MCNC: 1.11 MG/DL — SIGNIFICANT CHANGE UP (ref 0.5–1.3)
EGFR: 69 ML/MIN/1.73M2 — SIGNIFICANT CHANGE UP
EGFR: 69 ML/MIN/1.73M2 — SIGNIFICANT CHANGE UP
GLUCOSE BLDC GLUCOMTR-MCNC: 133 MG/DL — HIGH (ref 70–99)
GLUCOSE SERPL-MCNC: 134 MG/DL — HIGH (ref 70–99)
HCT VFR BLD CALC: 27 % — LOW (ref 39–50)
HGB BLD-MCNC: 8.7 G/DL — LOW (ref 13–17)
MCHC RBC-ENTMCNC: 30.2 PG — SIGNIFICANT CHANGE UP (ref 27–34)
MCHC RBC-ENTMCNC: 32.2 G/DL — SIGNIFICANT CHANGE UP (ref 32–36)
MCV RBC AUTO: 93.8 FL — SIGNIFICANT CHANGE UP (ref 80–100)
NRBC BLD AUTO-RTO: 0 /100 WBCS — SIGNIFICANT CHANGE UP (ref 0–0)
PLATELET # BLD AUTO: 98 K/UL — LOW (ref 150–400)
POTASSIUM SERPL-MCNC: 3.9 MMOL/L — SIGNIFICANT CHANGE UP (ref 3.5–5.3)
POTASSIUM SERPL-SCNC: 3.9 MMOL/L — SIGNIFICANT CHANGE UP (ref 3.5–5.3)
PROT SERPL-MCNC: 6.7 G/DL — SIGNIFICANT CHANGE UP (ref 6–8.3)
RBC # BLD: 2.88 M/UL — LOW (ref 4.2–5.8)
RBC # FLD: 21.6 % — HIGH (ref 10.3–14.5)
SODIUM SERPL-SCNC: 140 MMOL/L — SIGNIFICANT CHANGE UP (ref 135–145)
WBC # BLD: 3.96 K/UL — SIGNIFICANT CHANGE UP (ref 3.8–10.5)
WBC # FLD AUTO: 3.96 K/UL — SIGNIFICANT CHANGE UP (ref 3.8–10.5)

## 2025-03-25 PROCEDURE — 52649 PROSTATE LASER ENUCLEATION: CPT

## 2025-03-25 PROCEDURE — 85027 COMPLETE CBC AUTOMATED: CPT

## 2025-03-25 PROCEDURE — 80048 BASIC METABOLIC PNL TOTAL CA: CPT

## 2025-03-25 PROCEDURE — 86900 BLOOD TYPING SEROLOGIC ABO: CPT

## 2025-03-25 PROCEDURE — 80053 COMPREHEN METABOLIC PANEL: CPT

## 2025-03-25 PROCEDURE — 82962 GLUCOSE BLOOD TEST: CPT

## 2025-03-25 PROCEDURE — C1889: CPT

## 2025-03-25 PROCEDURE — 88305 TISSUE EXAM BY PATHOLOGIST: CPT

## 2025-03-25 PROCEDURE — C1769: CPT

## 2025-03-25 PROCEDURE — 36415 COLL VENOUS BLD VENIPUNCTURE: CPT

## 2025-03-25 PROCEDURE — C9399: CPT

## 2025-03-25 PROCEDURE — 86850 RBC ANTIBODY SCREEN: CPT

## 2025-03-25 PROCEDURE — 86901 BLOOD TYPING SEROLOGIC RH(D): CPT

## 2025-03-25 PROCEDURE — C1782: CPT

## 2025-03-25 RX ORDER — SULFAMETHOXAZOLE/TRIMETHOPRIM 800-160 MG
1 TABLET ORAL
Qty: 6 | Refills: 0
Start: 2025-03-25

## 2025-03-25 RX ADMIN — AMPICILLIN SODIUM 200 GRAM(S): 1 INJECTION, POWDER, FOR SOLUTION INTRAMUSCULAR; INTRAVENOUS at 01:02

## 2025-03-25 RX ADMIN — Medication 975 MILLIGRAM(S): at 06:04

## 2025-03-25 RX ADMIN — TAMSULOSIN HYDROCHLORIDE 0.4 MILLIGRAM(S): 0.4 CAPSULE ORAL at 06:04

## 2025-03-25 RX ADMIN — Medication 75 MILLILITER(S): at 07:58

## 2025-03-25 RX ADMIN — AMPICILLIN SODIUM 200 GRAM(S): 1 INJECTION, POWDER, FOR SOLUTION INTRAMUSCULAR; INTRAVENOUS at 07:58

## 2025-03-25 RX ADMIN — HEPARIN SODIUM 5000 UNIT(S): 1000 INJECTION INTRAVENOUS; SUBCUTANEOUS at 06:03

## 2025-03-25 RX ADMIN — Medication 975 MILLIGRAM(S): at 06:34

## 2025-03-25 RX ADMIN — LOSARTAN POTASSIUM 100 MILLIGRAM(S): 100 TABLET, FILM COATED ORAL at 06:05

## 2025-03-25 RX ADMIN — METOPROLOL SUCCINATE 100 MILLIGRAM(S): 50 TABLET, EXTENDED RELEASE ORAL at 06:04

## 2025-03-25 NOTE — DISCHARGE NOTE NURSING/CASE MANAGEMENT/SOCIAL WORK - PATIENT PORTAL LINK FT
You can access the FollowMyHealth Patient Portal offered by [facility] by registering at the following website: [email or web address]. By joining [employer]’s FollowMyHealth portal, you will also be able to view your health information using other applications (apps) compatible with our system.

## 2025-03-25 NOTE — DISCHARGE NOTE NURSING/CASE MANAGEMENT/SOCIAL WORK - NSDCPEFALRISK_GEN_ALL_CORE
For information on Fall & Injury Prevention, visit: [email or web address] OR  [email or web address] OR  https://www.cdc.gov/steadi/patient.html

## 2025-03-25 NOTE — PROGRESS NOTE ADULT - SUBJECTIVE AND OBJECTIVE BOX
UROLOGY PROGRESS NOTE:     Subjective: Patient seen and examined at bedside. No major events overnight.       Objective:  Vital signs  T(F): , Max: 98.3 (03-25-25 @ 05:03)  HR: 88 (03-25-25 @ 05:03)  BP: 124/70 (03-25-25 @ 05:03)  SpO2: 97% (03-25-25 @ 05:03)  Wt(kg): --    Output     I&O's Detail    24 Mar 2025 07:01  -  25 Mar 2025 06:54  --------------------------------------------------------  IN:    IV PiggyBack: 550 mL    Lactated Ringers: 150 mL    Oral Fluid: 730 mL    sodium chloride 0.9%: 975 mL  Total IN: 2405 mL    OUT:  Total OUT: 0 mL    Total NET: 2405 mL          Physical Exam:  Gen: no acute distress  Back: no CVAT b/l  Abd: soft nt nd  : 3way rocha in place, CBI off, urine clear. traction released    Labs:                        10.3   2.64  )-----------( 96       ( 24 Mar 2025 12:42 )             32.5     03-25    140  |  105  |  22  ----------------------------<  134[H]  3.9   |  23  |  1.11    Ca    8.9      25 Mar 2025 05:49    TPro  6.7  /  Alb  3.9  /  TBili  0.5  /  DBili  x   /  AST  11  /  ALT  12  /  AlkPhos  46  03-25          
 Post op Check    Pt seen and examined without complaints. Pain is controlled. Denies SOB/CP/N/V.     Vital Signs Last 24 Hrs  T(C): 36.1 (24 Mar 2025 12:00), Max: 36.5 (24 Mar 2025 05:59)  T(F): 97 (24 Mar 2025 12:00), Max: 97.7 (24 Mar 2025 05:59)  HR: 56 (24 Mar 2025 12:30) (56 - 67)  BP: 107/59 (24 Mar 2025 12:30) (105/54 - 122/73)  BP(mean): 77 (24 Mar 2025 12:30) (76 - 82)  RR: 18 (24 Mar 2025 12:30) (16 - 18)  SpO2: 96% (24 Mar 2025 12:30) (93% - 98%)    Parameters below as of 24 Mar 2025 12:00  Patient On (Oxygen Delivery Method): room air        I&O's Summary    24 Mar 2025 07:01  -  24 Mar 2025 12:51  --------------------------------------------------------  IN: 715 mL / OUT: 0 mL / NET: 715 mL        Physical Exam  Gen: NAD, A&Ox3  Pulm: No respiratory distress, no subcostal retractions  Abd: Soft, NT, ND  : rocha on traction, dried blood at meatus, but no active bleeding visualized. on CBI fast gtt, draining pink tinged                          8.9    2.54  )-----------( 81       ( 24 Mar 2025 09:51 )             27.5

## 2025-03-25 NOTE — DISCHARGE NOTE NURSING/CASE MANAGEMENT/SOCIAL WORK - FINANCIAL ASSISTANCE
[facility] provides services at a reduced cost to those who are determined to be eligible through [facility]’s financial assistance program. Information regarding [facility]’s financial assistance program can be found by going to [email or web address] or by calling [phone].

## 2025-03-25 NOTE — PROGRESS NOTE ADULT - ASSESSMENT
A/P: 74y male s/p laser enucleation of prostate POD#1    -am labs  -cont to observe urine color  -likely dc home with rocha later today

## 2025-03-25 NOTE — DISCHARGE NOTE PROVIDER - HOSPITAL COURSE
75 y/o male admitted after elective laser enucleation of the prostate 3/24/2025. pt did well post op. received txa x 1 POD#0. pt did well overnight. CBI clamped and traction released POD#1. urine remained relatively clear. pt stable for discharge with rocha in place

## 2025-03-25 NOTE — DISCHARGE NOTE PROVIDER - CARE PROVIDER_API CALL
Hussein Rutherford  Urology  [address] [place] [address], [address]  [place] [address]  Phone: [phone]  Fax: [phone]  Established Patient  Scheduled Appointment: 03/27/2025

## 2025-03-25 NOTE — DISCHARGE NOTE PROVIDER - NSDCMRMEDTOKEN_GEN_ALL_CORE_FT
Bactrim  mg-160 mg oral tablet: 1 tab(s) orally 2 times a day  finasteride 5 mg oral tablet: 1 tab(s) orally once a day  folic acid 1 mg oral tablet: 1 tab(s) orally once a day  hydroCHLOROthiazide 25 mg oral tablet: 1 tab(s) orally once a day  metFORMIN 500 mg oral tablet: 1 tab(s) orally 2 times a day  metoprolol tartrate 100 mg oral tablet: 1 tab(s) orally 2 times a day  olmesartan: 1 tab(s) orally once a day  tamsulosin 0.4 mg oral capsule: 1 tab(s) orally 2 times a day

## 2025-03-26 PROBLEM — N40.0 BENIGN PROSTATIC HYPERPLASIA WITHOUT LOWER URINARY TRACT SYMPTOMS: Chronic | Status: ACTIVE | Noted: 2025-03-03

## 2025-03-27 ENCOUNTER — APPOINTMENT (OUTPATIENT)
Dept: UROLOGY | Facility: CLINIC | Age: 75
End: 2025-03-27
Payer: MEDICARE

## 2025-03-27 DIAGNOSIS — N40.1 BENIGN PROSTATIC HYPERPLASIA WITH LOWER URINARY TRACT SYMPMS: ICD-10-CM

## 2025-03-27 DIAGNOSIS — N13.8 BENIGN PROSTATIC HYPERPLASIA WITH LOWER URINARY TRACT SYMPMS: ICD-10-CM

## 2025-03-27 PROCEDURE — 99024 POSTOP FOLLOW-UP VISIT: CPT

## 2025-04-01 LAB — SURGICAL PATHOLOGY STUDY: SIGNIFICANT CHANGE UP

## 2025-04-10 ENCOUNTER — NON-APPOINTMENT (OUTPATIENT)
Age: 75
End: 2025-04-10

## 2025-04-21 ENCOUNTER — NON-APPOINTMENT (OUTPATIENT)
Age: 75
End: 2025-04-21

## 2025-04-30 DIAGNOSIS — N40.1 BENIGN PROSTATIC HYPERPLASIA WITH LOWER URINARY TRACT SYMPMS: ICD-10-CM

## 2025-04-30 DIAGNOSIS — N13.8 BENIGN PROSTATIC HYPERPLASIA WITH LOWER URINARY TRACT SYMPMS: ICD-10-CM

## 2025-05-05 ENCOUNTER — NON-APPOINTMENT (OUTPATIENT)
Age: 75
End: 2025-05-05

## 2025-05-07 ENCOUNTER — APPOINTMENT (OUTPATIENT)
Dept: UROLOGY | Facility: CLINIC | Age: 75
End: 2025-05-07
Payer: MEDICARE

## 2025-05-07 DIAGNOSIS — N39.3 STRESS INCONTINENCE (FEMALE) (MALE): ICD-10-CM

## 2025-05-07 PROCEDURE — 99024 POSTOP FOLLOW-UP VISIT: CPT

## 2025-06-26 ENCOUNTER — OFFICE (OUTPATIENT)
Facility: LOCATION | Age: 75
Setting detail: OPHTHALMOLOGY
End: 2025-06-26
Payer: MEDICARE

## 2025-06-26 DIAGNOSIS — E11.9: ICD-10-CM

## 2025-06-26 DIAGNOSIS — H10.433: ICD-10-CM

## 2025-06-26 DIAGNOSIS — H26.493: ICD-10-CM

## 2025-06-26 DIAGNOSIS — H47.323: ICD-10-CM

## 2025-06-26 DIAGNOSIS — H16.223: ICD-10-CM

## 2025-06-26 PROBLEM — H43.811 VITREOUS DETACHMENT; RIGHT EYE: Status: ACTIVE | Noted: 2025-06-26

## 2025-06-26 PROCEDURE — 92014 COMPRE OPH EXAM EST PT 1/>: CPT | Performed by: OPTOMETRIST

## 2025-06-26 PROCEDURE — 92250 FUNDUS PHOTOGRAPHY W/I&R: CPT | Performed by: OPTOMETRIST

## 2025-06-26 ASSESSMENT — CONFRONTATIONAL VISUAL FIELD TEST (CVF)
OD_FINDINGS: FULL
OS_FINDINGS: FULL

## 2025-06-26 ASSESSMENT — TONOMETRY
OD_IOP_MMHG: 11
OS_IOP_MMHG: 11

## 2025-06-26 ASSESSMENT — LID EXAM ASSESSMENTS
OD_BLEPHARITIS: RUL T
OS_BLEPHARITIS: LUL T
OD_COMMENTS: ROSACEA
OS_COMMENTS: ROSACEA

## 2025-06-26 ASSESSMENT — SUPERFICIAL PUNCTATE KERATITIS (SPK)
OD_SPK: T
OS_SPK: T

## 2025-06-30 PROBLEM — H52.13 MYOPIA; BOTH EYES: Status: ACTIVE | Noted: 2025-06-26

## 2025-07-01 PROBLEM — H02.402 PTOSIS OF EYELID, UNSPECIFIED; LEFT EYE: Status: ACTIVE | Noted: 2025-06-26

## 2025-07-01 PROBLEM — H35.361 DRUSEN; RIGHT EYE: Status: ACTIVE | Noted: 2025-06-26

## 2025-07-01 ASSESSMENT — REFRACTION_MANIFEST
OS_SPHERE: -1.50
OS_SPHERE: -0.50
OD_SPHERE: PLANO
OD_VA1: 20/25-2
OD_ADD: +2.75
OD_VA2: 20/20
OS_VA2: 20/20
OS_VA1: 20/20
OD_SPHERE: -0.50
OS_ADD: +2.75
OD_AXIS: 180
OS_AXIS: 120
OS_VA2: 20/20(J1+)
OD_VA1: 20/25
OS_AXIS: 005
OD_SPHERE: -0.50
OD_AXIS: 005
OS_VA1: 20/25-
OS_ADD: +2.75
OD_ADD: +2.75
OS_AXIS: 135
OD_VA2: 20/20(J1+)
OS_CYLINDER: +0.75
OS_AXIS: 5
OU_VA: 20-1
OD_CYLINDER: +0.50
OS_SPHERE: -0.50
OD_SPHERE: -0.50
OD_AXIS: 5
OS_CYLINDER: +0.50
OS_ADD: +2.75
OS_CYLINDER: +0.50
OU_VA: 20/25
OD_SPHERE: -0.50
OS_VA1: 20/20-2
OD_CYLINDER: +0.75
OS_SPHERE: PLANO
OD_VA1: 20/25
OS_SPHERE: -1.00
OS_CYLINDER: SPHERE
OD_AXIS: 030
OD_VA1: 20/25-2
OS_VA1: 20/25-2
OD_CYLINDER: +0.50
OD_CYLINDER: SPHERE
OS_CYLINDER: +0.50
OS_VA1: 20/25
OD_ADD: +2.75
OD_VA1: 20/20-2
OD_CYLINDER: +0.50

## 2025-07-01 ASSESSMENT — REFRACTION_CURRENTRX
OS_CYLINDER: +0.25
OD_ADD: +2.75
OD_OVR_VA: 20/
OD_CYLINDER: +0.25
OS_SPHERE: +1.50
OS_OVR_VA: 20/
OD_AXIS: 043
OS_VPRISM_DIRECTION: PROGS
OS_AXIS: 175
OD_VPRISM_DIRECTION: PROGS
OS_ADD: +2.75
OD_SPHERE: +2.00

## 2025-07-01 ASSESSMENT — REFRACTION_AUTOREFRACTION
OS_SPHERE: -0.50
OD_AXIS: 030
OS_CYLINDER: +1.00
OD_SPHERE: -0.25
OD_CYLINDER: +0.75
OS_AXIS: 134

## 2025-07-01 ASSESSMENT — KERATOMETRY
OS_K1POWER_DIOPTERS: 42.25
OS_K2POWER_DIOPTERS: 42.75
OS_AXISANGLE_DEGREES: 107

## 2025-07-01 ASSESSMENT — VISUAL ACUITY
OD_BCVA: 20/20-1
OS_BCVA: 20/20-1

## 2025-07-09 ENCOUNTER — APPOINTMENT (OUTPATIENT)
Dept: UROLOGY | Facility: CLINIC | Age: 75
End: 2025-07-09
Payer: MEDICARE

## 2025-07-09 VITALS
RESPIRATION RATE: 16 BRPM | BODY MASS INDEX: 28.12 KG/M2 | TEMPERATURE: 98 F | SYSTOLIC BLOOD PRESSURE: 124 MMHG | OXYGEN SATURATION: 96 % | DIASTOLIC BLOOD PRESSURE: 72 MMHG | HEIGHT: 66 IN | HEART RATE: 58 BPM | WEIGHT: 175 LBS

## 2025-07-09 PROCEDURE — 99213 OFFICE O/P EST LOW 20 MIN: CPT

## 2025-07-11 LAB — BACTERIA UR CULT: NORMAL

## 2025-08-06 ENCOUNTER — APPOINTMENT (OUTPATIENT)
Dept: UROLOGY | Facility: CLINIC | Age: 75
End: 2025-08-06
Payer: MEDICARE

## 2025-08-06 VITALS
WEIGHT: 170 LBS | HEIGHT: 66 IN | BODY MASS INDEX: 27.32 KG/M2 | DIASTOLIC BLOOD PRESSURE: 70 MMHG | SYSTOLIC BLOOD PRESSURE: 127 MMHG | HEART RATE: 65 BPM

## 2025-08-06 PROCEDURE — 99215 OFFICE O/P EST HI 40 MIN: CPT

## (undated) DEVICE — GLV 7.5 PROTEXIS (WHITE)

## (undated) DEVICE — WARMING BLANKET UPPER ADULT

## (undated) DEVICE — TUBING SUCTION 20FT

## (undated) DEVICE — FOLEY HOLDER STATLOCK 2 WAY ADULT

## (undated) DEVICE — SOL IRR POUR H2O 1500ML

## (undated) DEVICE — TUBING THERMADX UROLOGY

## (undated) DEVICE — ELCTR PLASMA ROLLER 24FR 12-30 DEG

## (undated) DEVICE — FOLEY CATH 3-WAY 22FR 30CC LATEX HEMATURIA COUDE

## (undated) DEVICE — CATH IV SAFE INSYTE 14G X 1.75" (ORANGE)

## (undated) DEVICE — CANISTER DISPOSABLE THIN WALL 3000CC

## (undated) DEVICE — PACK CYSTO

## (undated) DEVICE — BLADDER EVACUATOR ELLIK DISP STERILE

## (undated) DEVICE — Device

## (undated) DEVICE — GOWN LG

## (undated) DEVICE — NSM-GU WOLF PIRANHA MORCELLATOR: Type: DURABLE MEDICAL EQUIPMENT

## (undated) DEVICE — BLADE SURGICAL #11 CARBON

## (undated) DEVICE — VENODYNE/SCD SLEEVE CALF MEDIUM

## (undated) DEVICE — TUBING SET TUR BLADDER IRRIGATION Y-TYPE 81"

## (undated) DEVICE — FOLEY HOLDER STATLOCK 3 WAY ADULT

## (undated) DEVICE — SOL IRR BAG H2O 3000ML

## (undated) DEVICE — DRAPE U (CLEAR) 47 X 51"

## (undated) DEVICE — ELCTR PLASMA LOOP MEDIUM ANGLED 24FR 12-30 DEG

## (undated) DEVICE — SYR LUER LOK 30CC

## (undated) DEVICE — POSITIONER FOAM HEADREST (PINK)

## (undated) DEVICE — POSITIONER FOAM EGG CRATE ULNAR 2PCS (PINK)

## (undated) DEVICE — TUBING SET FOR SUCTION PUMP

## (undated) DEVICE — BAG URINE W METER 2L

## (undated) DEVICE — DRAPE LINGEMAN TUR

## (undated) DEVICE — SOL IRR BAG NS 0.9% 3000ML

## (undated) DEVICE — CONTAINER TISSUE COLLECTING DISP

## (undated) DEVICE — LABELS BLANK W PEN

## (undated) DEVICE — ELCTR PLASMA BUTTON OVAL 24FR 12-30 DEG

## (undated) DEVICE — NDL COUNTER FOAM AND MAGNET 40-70

## (undated) DEVICE — GOWN XXXL

## (undated) DEVICE — FOLEY CATH 3-WAY 22FR 30CC LATEX LUBRICATH

## (undated) DEVICE — TUBING RANGER FLUID IRRIGATION SET DISP

## (undated) DEVICE — MEDELA OVERFLOW FILTER DISPOSABLE

## (undated) DEVICE — IRRIGATION TRAY W PISTON SYRINGE 60ML

## (undated) DEVICE — PREP BETADINE KIT